# Patient Record
Sex: MALE | URBAN - METROPOLITAN AREA
[De-identification: names, ages, dates, MRNs, and addresses within clinical notes are randomized per-mention and may not be internally consistent; named-entity substitution may affect disease eponyms.]

---

## 2023-11-10 ENCOUNTER — APPOINTMENT (OUTPATIENT)
Dept: RADIOLOGY | Facility: HOSPITAL | Age: 28
DRG: 196 | End: 2023-11-10

## 2023-11-10 ENCOUNTER — HOSPITAL ENCOUNTER (INPATIENT)
Facility: HOSPITAL | Age: 28
LOS: 6 days | Discharge: HOME | DRG: 196 | End: 2023-11-17
Attending: EMERGENCY MEDICINE | Admitting: INTERNAL MEDICINE

## 2023-11-10 DIAGNOSIS — J15.9 BACTERIAL PNEUMONIA: ICD-10-CM

## 2023-11-10 DIAGNOSIS — J98.4 CAVITARY LESION OF LUNG: Primary | ICD-10-CM

## 2023-11-10 DIAGNOSIS — I76 SEPTIC EMBOLISM (MULTI): ICD-10-CM

## 2023-11-10 PROCEDURE — 86140 C-REACTIVE PROTEIN: CPT

## 2023-11-10 PROCEDURE — 71046 X-RAY EXAM CHEST 2 VIEWS: CPT | Mod: FOREIGN READ | Performed by: RADIOLOGY

## 2023-11-10 PROCEDURE — 96361 HYDRATE IV INFUSION ADD-ON: CPT

## 2023-11-10 PROCEDURE — 36415 COLL VENOUS BLD VENIPUNCTURE: CPT | Performed by: EMERGENCY MEDICINE

## 2023-11-10 PROCEDURE — 85027 COMPLETE CBC AUTOMATED: CPT | Performed by: EMERGENCY MEDICINE

## 2023-11-10 PROCEDURE — 99285 EMERGENCY DEPT VISIT HI MDM: CPT | Mod: 25 | Performed by: EMERGENCY MEDICINE

## 2023-11-10 PROCEDURE — 71046 X-RAY EXAM CHEST 2 VIEWS: CPT | Mod: FY,FR

## 2023-11-10 PROCEDURE — 87385 HISTOPLASMA CAPSUL AG IA: CPT

## 2023-11-10 PROCEDURE — 99285 EMERGENCY DEPT VISIT HI MDM: CPT | Mod: 25

## 2023-11-10 PROCEDURE — 96365 THER/PROPH/DIAG IV INF INIT: CPT

## 2023-11-10 PROCEDURE — 83690 ASSAY OF LIPASE: CPT | Performed by: EMERGENCY MEDICINE

## 2023-11-10 PROCEDURE — 2500000004 HC RX 250 GENERAL PHARMACY W/ HCPCS (ALT 636 FOR OP/ED): Performed by: EMERGENCY MEDICINE

## 2023-11-10 PROCEDURE — 99285 EMERGENCY DEPT VISIT HI MDM: CPT | Performed by: EMERGENCY MEDICINE

## 2023-11-10 PROCEDURE — 96375 TX/PRO/DX INJ NEW DRUG ADDON: CPT

## 2023-11-10 PROCEDURE — 96367 TX/PROPH/DG ADDL SEQ IV INF: CPT

## 2023-11-10 PROCEDURE — 85025 COMPLETE CBC W/AUTO DIFF WBC: CPT

## 2023-11-10 PROCEDURE — 80053 COMPREHEN METABOLIC PANEL: CPT | Performed by: EMERGENCY MEDICINE

## 2023-11-10 RX ORDER — ONDANSETRON HYDROCHLORIDE 2 MG/ML
4 INJECTION, SOLUTION INTRAVENOUS ONCE
Status: COMPLETED | OUTPATIENT
Start: 2023-11-10 | End: 2023-11-10

## 2023-11-10 RX ADMIN — ONDANSETRON 4 MG: 2 INJECTION INTRAMUSCULAR; INTRAVENOUS at 23:39

## 2023-11-10 RX ADMIN — SODIUM CHLORIDE 1000 ML: 9 INJECTION, SOLUTION INTRAVENOUS at 23:40

## 2023-11-10 ASSESSMENT — PAIN DESCRIPTION - ONSET: ONSET: ONGOING

## 2023-11-10 ASSESSMENT — PAIN - FUNCTIONAL ASSESSMENT: PAIN_FUNCTIONAL_ASSESSMENT: 0-10

## 2023-11-10 ASSESSMENT — PAIN DESCRIPTION - FREQUENCY: FREQUENCY: CONSTANT/CONTINUOUS

## 2023-11-10 ASSESSMENT — PAIN SCALES - GENERAL: PAINLEVEL_OUTOF10: 8

## 2023-11-10 ASSESSMENT — PAIN DESCRIPTION - DESCRIPTORS
DESCRIPTORS: STABBING;SHARP
DESCRIPTORS: ACHING

## 2023-11-10 ASSESSMENT — PAIN DESCRIPTION - PAIN TYPE: TYPE: ACUTE PAIN

## 2023-11-10 ASSESSMENT — COLUMBIA-SUICIDE SEVERITY RATING SCALE - C-SSRS
1. IN THE PAST MONTH, HAVE YOU WISHED YOU WERE DEAD OR WISHED YOU COULD GO TO SLEEP AND NOT WAKE UP?: NO
2. HAVE YOU ACTUALLY HAD ANY THOUGHTS OF KILLING YOURSELF?: NO
6. HAVE YOU EVER DONE ANYTHING, STARTED TO DO ANYTHING, OR PREPARED TO DO ANYTHING TO END YOUR LIFE?: NO

## 2023-11-10 ASSESSMENT — PAIN DESCRIPTION - LOCATION: LOCATION: CHEST

## 2023-11-10 ASSESSMENT — PAIN DESCRIPTION - PROGRESSION: CLINICAL_PROGRESSION: GRADUALLY WORSENING

## 2023-11-10 ASSESSMENT — PAIN DESCRIPTION - ORIENTATION: ORIENTATION: RIGHT

## 2023-11-10 NOTE — Clinical Note
RLL lung bx complete. 2 core samples obtained. Dressing C/D/I. Total 2mg versed, 100mcg fentanyl given. VSS t/o procedure.

## 2023-11-11 ENCOUNTER — APPOINTMENT (OUTPATIENT)
Dept: RADIOLOGY | Facility: HOSPITAL | Age: 28
DRG: 196 | End: 2023-11-11

## 2023-11-11 PROBLEM — I76 SEPTIC EMBOLISM (MULTI): Status: ACTIVE | Noted: 2023-11-11

## 2023-11-11 LAB
ALBUMIN SERPL BCP-MCNC: 4 G/DL (ref 3.4–5)
ALP SERPL-CCNC: 113 U/L (ref 33–120)
ALT SERPL W P-5'-P-CCNC: 24 U/L (ref 10–52)
AMPHETAMINES UR QL SCN: ABNORMAL
ANION GAP SERPL CALC-SCNC: 12 MMOL/L (ref 10–20)
APPEARANCE UR: CLEAR
APPEARANCE UR: CLEAR
AST SERPL W P-5'-P-CCNC: 15 U/L (ref 9–39)
BARBITURATES UR QL SCN: ABNORMAL
BASOPHILS # BLD AUTO: 0.05 X10*3/UL (ref 0–0.1)
BASOPHILS NFR BLD AUTO: 0.3 %
BENZODIAZ UR QL SCN: ABNORMAL
BILIRUB SERPL-MCNC: 0.9 MG/DL (ref 0–1.2)
BILIRUB UR STRIP.AUTO-MCNC: NEGATIVE MG/DL
BILIRUB UR STRIP.AUTO-MCNC: NEGATIVE MG/DL
BUN SERPL-MCNC: 11 MG/DL (ref 6–23)
BZE UR QL SCN: ABNORMAL
CALCIUM SERPL-MCNC: 9.7 MG/DL (ref 8.6–10.6)
CANNABINOIDS UR QL SCN: ABNORMAL
CHLORIDE SERPL-SCNC: 98 MMOL/L (ref 98–107)
CO2 SERPL-SCNC: 27 MMOL/L (ref 21–32)
COLOR UR: YELLOW
COLOR UR: YELLOW
CREAT SERPL-MCNC: 0.79 MG/DL (ref 0.5–1.3)
CRP SERPL-MCNC: 11.8 MG/DL
EOSINOPHIL # BLD AUTO: 0.01 X10*3/UL (ref 0–0.7)
EOSINOPHIL NFR BLD AUTO: 0.1 %
ERYTHROCYTE [DISTWIDTH] IN BLOOD BY AUTOMATED COUNT: 11.5 % (ref 11.5–14.5)
ERYTHROCYTE [DISTWIDTH] IN BLOOD BY AUTOMATED COUNT: 11.5 % (ref 11.5–14.5)
FENTANYL+NORFENTANYL UR QL SCN: ABNORMAL
GFR SERPL CREATININE-BSD FRML MDRD: >90 ML/MIN/1.73M*2
GLUCOSE SERPL-MCNC: 81 MG/DL (ref 74–99)
GLUCOSE UR STRIP.AUTO-MCNC: NEGATIVE MG/DL
GLUCOSE UR STRIP.AUTO-MCNC: NEGATIVE MG/DL
HCT VFR BLD AUTO: 43.2 % (ref 41–52)
HCT VFR BLD AUTO: 43.2 % (ref 41–52)
HGB BLD-MCNC: 14.7 G/DL (ref 13.5–17.5)
HGB BLD-MCNC: 14.7 G/DL (ref 13.5–17.5)
HOLD SPECIMEN: NORMAL
HOLD SPECIMEN: NORMAL
IMM GRANULOCYTES # BLD AUTO: 0.07 X10*3/UL (ref 0–0.7)
IMM GRANULOCYTES NFR BLD AUTO: 0.5 % (ref 0–0.9)
KETONES UR STRIP.AUTO-MCNC: NEGATIVE MG/DL
KETONES UR STRIP.AUTO-MCNC: NEGATIVE MG/DL
LEUKOCYTE ESTERASE UR QL STRIP.AUTO: NEGATIVE
LEUKOCYTE ESTERASE UR QL STRIP.AUTO: NEGATIVE
LIPASE SERPL-CCNC: 8 U/L (ref 9–82)
LYMPHOCYTES # BLD AUTO: 1.75 X10*3/UL (ref 1.2–4.8)
LYMPHOCYTES NFR BLD AUTO: 11.7 %
MCH RBC QN AUTO: 28.9 PG (ref 26–34)
MCH RBC QN AUTO: 28.9 PG (ref 26–34)
MCHC RBC AUTO-ENTMCNC: 34 G/DL (ref 32–36)
MCHC RBC AUTO-ENTMCNC: 34 G/DL (ref 32–36)
MCV RBC AUTO: 85 FL (ref 80–100)
MCV RBC AUTO: 85 FL (ref 80–100)
MONOCYTES # BLD AUTO: 1.03 X10*3/UL (ref 0.1–1)
MONOCYTES NFR BLD AUTO: 6.9 %
NEUTROPHILS # BLD AUTO: 12.03 X10*3/UL (ref 1.2–7.7)
NEUTROPHILS NFR BLD AUTO: 80.5 %
NITRITE UR QL STRIP.AUTO: NEGATIVE
NITRITE UR QL STRIP.AUTO: NEGATIVE
NRBC BLD-RTO: 0 /100 WBCS (ref 0–0)
NRBC BLD-RTO: 0 /100 WBCS (ref 0–0)
OPIATES UR QL SCN: ABNORMAL
OXYCODONE+OXYMORPHONE UR QL SCN: ABNORMAL
PCP UR QL SCN: ABNORMAL
PH UR STRIP.AUTO: 6 [PH]
PH UR STRIP.AUTO: 6 [PH]
PLATELET # BLD AUTO: 331 X10*3/UL (ref 150–450)
PLATELET # BLD AUTO: 331 X10*3/UL (ref 150–450)
POTASSIUM SERPL-SCNC: 3.9 MMOL/L (ref 3.5–5.3)
PROT SERPL-MCNC: 8.1 G/DL (ref 6.4–8.2)
PROT UR STRIP.AUTO-MCNC: NEGATIVE MG/DL
PROT UR STRIP.AUTO-MCNC: NEGATIVE MG/DL
RBC # BLD AUTO: 5.09 X10*6/UL (ref 4.5–5.9)
RBC # BLD AUTO: 5.09 X10*6/UL (ref 4.5–5.9)
RBC # UR STRIP.AUTO: NEGATIVE /UL
RBC # UR STRIP.AUTO: NEGATIVE /UL
SODIUM SERPL-SCNC: 133 MMOL/L (ref 136–145)
SP GR UR STRIP.AUTO: 1.02
SP GR UR STRIP.AUTO: 1.05
UROBILINOGEN UR STRIP.AUTO-MCNC: 2 MG/DL
UROBILINOGEN UR STRIP.AUTO-MCNC: 4 MG/DL
WBC # BLD AUTO: 15.2 X10*3/UL (ref 4.4–11.3)
WBC # BLD AUTO: 15.2 X10*3/UL (ref 4.4–11.3)

## 2023-11-11 PROCEDURE — 74177 CT ABD & PELVIS W/CONTRAST: CPT | Mod: FOREIGN READ | Performed by: RADIOLOGY

## 2023-11-11 PROCEDURE — 1100000001 HC PRIVATE ROOM DAILY

## 2023-11-11 PROCEDURE — 2500000004 HC RX 250 GENERAL PHARMACY W/ HCPCS (ALT 636 FOR OP/ED)

## 2023-11-11 PROCEDURE — 87899 AGENT NOS ASSAY W/OPTIC: CPT

## 2023-11-11 PROCEDURE — 36415 COLL VENOUS BLD VENIPUNCTURE: CPT

## 2023-11-11 PROCEDURE — 87040 BLOOD CULTURE FOR BACTERIA: CPT

## 2023-11-11 PROCEDURE — 87081 CULTURE SCREEN ONLY: CPT

## 2023-11-11 PROCEDURE — 2500000001 HC RX 250 WO HCPCS SELF ADMINISTERED DRUGS (ALT 637 FOR MEDICARE OP)

## 2023-11-11 PROCEDURE — 2500000004 HC RX 250 GENERAL PHARMACY W/ HCPCS (ALT 636 FOR OP/ED): Performed by: INTERNAL MEDICINE

## 2023-11-11 PROCEDURE — 2550000001 HC RX 255 CONTRASTS: Performed by: EMERGENCY MEDICINE

## 2023-11-11 PROCEDURE — 87305 ASPERGILLUS AG IA: CPT

## 2023-11-11 PROCEDURE — 84145 PROCALCITONIN (PCT): CPT

## 2023-11-11 PROCEDURE — 87449 NOS EACH ORGANISM AG IA: CPT

## 2023-11-11 PROCEDURE — 80307 DRUG TEST PRSMV CHEM ANLYZR: CPT

## 2023-11-11 PROCEDURE — 71275 CT ANGIOGRAPHY CHEST: CPT

## 2023-11-11 PROCEDURE — 71275 CT ANGIOGRAPHY CHEST: CPT | Mod: FOREIGN READ | Performed by: RADIOLOGY

## 2023-11-11 PROCEDURE — 87385 HISTOPLASMA CAPSUL AG IA: CPT

## 2023-11-11 PROCEDURE — 99223 1ST HOSP IP/OBS HIGH 75: CPT | Performed by: INTERNAL MEDICINE

## 2023-11-11 PROCEDURE — 74177 CT ABD & PELVIS W/CONTRAST: CPT | Mod: FR

## 2023-11-11 PROCEDURE — 2550000001 HC RX 255 CONTRASTS

## 2023-11-11 PROCEDURE — 81003 URINALYSIS AUTO W/O SCOPE: CPT

## 2023-11-11 PROCEDURE — 81003 URINALYSIS AUTO W/O SCOPE: CPT | Performed by: EMERGENCY MEDICINE

## 2023-11-11 RX ORDER — SODIUM CHLORIDE, SODIUM LACTATE, POTASSIUM CHLORIDE, CALCIUM CHLORIDE 600; 310; 30; 20 MG/100ML; MG/100ML; MG/100ML; MG/100ML
75 INJECTION, SOLUTION INTRAVENOUS CONTINUOUS
Status: DISCONTINUED | OUTPATIENT
Start: 2023-11-11 | End: 2023-11-13

## 2023-11-11 RX ORDER — AZITHROMYCIN 500 MG/1
500 TABLET, FILM COATED ORAL
Status: COMPLETED | OUTPATIENT
Start: 2023-11-11 | End: 2023-11-13

## 2023-11-11 RX ORDER — VANCOMYCIN HYDROCHLORIDE 750 MG/150ML
1.5 INJECTION, SOLUTION INTRAVENOUS ONCE
Status: COMPLETED | OUTPATIENT
Start: 2023-11-11 | End: 2023-11-11

## 2023-11-11 RX ADMIN — PIPERACILLIN SODIUM AND TAZOBACTAM SODIUM 4.5 G: 4; .5 INJECTION, SOLUTION INTRAVENOUS at 16:12

## 2023-11-11 RX ADMIN — IOHEXOL 75 ML: 350 INJECTION, SOLUTION INTRAVENOUS at 02:18

## 2023-11-11 RX ADMIN — VANCOMYCIN HYDROCHLORIDE 1.5 G: 750 INJECTION, SOLUTION INTRAVENOUS at 04:10

## 2023-11-11 RX ADMIN — PIPERACILLIN SODIUM AND TAZOBACTAM SODIUM 4.5 G: 4; .5 INJECTION, SOLUTION INTRAVENOUS at 23:24

## 2023-11-11 RX ADMIN — AZITHROMYCIN DIHYDRATE 500 MG: 500 TABLET ORAL at 10:12

## 2023-11-11 RX ADMIN — PIPERACILLIN SODIUM AND TAZOBACTAM SODIUM 4.5 G: 4; .5 INJECTION, SOLUTION INTRAVENOUS at 10:23

## 2023-11-11 RX ADMIN — VANCOMYCIN HYDROCHLORIDE 1500 MG: 1.5 INJECTION, POWDER, LYOPHILIZED, FOR SOLUTION INTRAVENOUS at 16:00

## 2023-11-11 RX ADMIN — TAZOBACTAM SODIUM AND PIPERACILLIN SODIUM 4.5 G: 500; 4 INJECTION, SOLUTION INTRAVENOUS at 03:40

## 2023-11-11 RX ADMIN — IOHEXOL 100 ML: 350 INJECTION, SOLUTION INTRAVENOUS at 00:39

## 2023-11-11 RX ADMIN — SODIUM CHLORIDE, POTASSIUM CHLORIDE, SODIUM LACTATE AND CALCIUM CHLORIDE 75 ML/HR: 600; 310; 30; 20 INJECTION, SOLUTION INTRAVENOUS at 20:57

## 2023-11-11 SDOH — SOCIAL STABILITY: SOCIAL INSECURITY: ARE THERE ANY APPARENT SIGNS OF INJURIES/BEHAVIORS THAT COULD BE RELATED TO ABUSE/NEGLECT?: NO

## 2023-11-11 SDOH — SOCIAL STABILITY: SOCIAL INSECURITY: HAS ANYONE EVER THREATENED TO HURT YOUR FAMILY OR YOUR PETS?: NO

## 2023-11-11 SDOH — SOCIAL STABILITY: SOCIAL INSECURITY: WERE YOU ABLE TO COMPLETE ALL THE BEHAVIORAL HEALTH SCREENINGS?: YES

## 2023-11-11 SDOH — SOCIAL STABILITY: SOCIAL INSECURITY: ABUSE: ADULT

## 2023-11-11 SDOH — SOCIAL STABILITY: SOCIAL INSECURITY: HAVE YOU HAD THOUGHTS OF HARMING ANYONE ELSE?: NO

## 2023-11-11 SDOH — SOCIAL STABILITY: SOCIAL INSECURITY: DO YOU FEEL UNSAFE GOING BACK TO THE PLACE WHERE YOU ARE LIVING?: NO

## 2023-11-11 SDOH — SOCIAL STABILITY: SOCIAL INSECURITY: DO YOU FEEL ANYONE HAS EXPLOITED OR TAKEN ADVANTAGE OF YOU FINANCIALLY OR OF YOUR PERSONAL PROPERTY?: NO

## 2023-11-11 SDOH — SOCIAL STABILITY: SOCIAL INSECURITY: DOES ANYONE TRY TO KEEP YOU FROM HAVING/CONTACTING OTHER FRIENDS OR DOING THINGS OUTSIDE YOUR HOME?: NO

## 2023-11-11 SDOH — SOCIAL STABILITY: SOCIAL INSECURITY: ARE YOU OR HAVE YOU BEEN THREATENED OR ABUSED PHYSICALLY, EMOTIONALLY, OR SEXUALLY BY ANYONE?: NO

## 2023-11-11 ASSESSMENT — LIFESTYLE VARIABLES
SUBSTANCE_ABUSE_PAST_12_MONTHS: YES
HOW OFTEN DO YOU HAVE A DRINK CONTAINING ALCOHOL: MONTHLY OR LESS
HOW MANY STANDARD DRINKS CONTAINING ALCOHOL DO YOU HAVE ON A TYPICAL DAY: 1 OR 2
SKIP TO QUESTIONS 9-10: 1
HOW OFTEN DO YOU HAVE 6 OR MORE DRINKS ON ONE OCCASION: NEVER
AUDIT-C TOTAL SCORE: 1
AUDIT-C TOTAL SCORE: 1
PRESCIPTION_ABUSE_PAST_12_MONTHS: NO

## 2023-11-11 ASSESSMENT — ACTIVITIES OF DAILY LIVING (ADL)
PATIENT'S MEMORY ADEQUATE TO SAFELY COMPLETE DAILY ACTIVITIES?: YES
GROOMING: INDEPENDENT
TOILETING: INDEPENDENT
ADEQUATE_TO_COMPLETE_ADL: YES
HEARING - LEFT EAR: FUNCTIONAL
HEARING - RIGHT EAR: FUNCTIONAL
BATHING: INDEPENDENT
WALKS IN HOME: INDEPENDENT
JUDGMENT_ADEQUATE_SAFELY_COMPLETE_DAILY_ACTIVITIES: YES
LACK_OF_TRANSPORTATION: NO
FEEDING YOURSELF: INDEPENDENT
DRESSING YOURSELF: INDEPENDENT
LACK_OF_TRANSPORTATION: NO

## 2023-11-11 ASSESSMENT — COGNITIVE AND FUNCTIONAL STATUS - GENERAL
DAILY ACTIVITIY SCORE: 24
MOBILITY SCORE: 24
PATIENT BASELINE BEDBOUND: NO

## 2023-11-11 ASSESSMENT — PATIENT HEALTH QUESTIONNAIRE - PHQ9
1. LITTLE INTEREST OR PLEASURE IN DOING THINGS: NOT AT ALL
2. FEELING DOWN, DEPRESSED OR HOPELESS: NOT AT ALL
SUM OF ALL RESPONSES TO PHQ9 QUESTIONS 1 & 2: 0

## 2023-11-11 ASSESSMENT — PAIN SCALES - GENERAL: PAINLEVEL_OUTOF10: 8

## 2023-11-11 NOTE — PROGRESS NOTES
"Vancomycin Dosing by Pharmacy- INITIAL    Deff Uriel Saldivar is a 28 y.o. year old male who Pharmacy has been consulted for vancomycin dosing for endocarditis/endovascular infection. Based on the patient's indication and renal status this patient will be dosed based on a goal AUC of 500-600.     Renal function is currently stable.    Visit Vitals  /73   Pulse 102   Temp 37.3 °C (99.1 °F) (Oral)   Resp 14        Lab Results   Component Value Date    CREATININE 0.79 11/10/2023        Patient weight is No results found for: \"PTWEIGHT\"    No results found for: \"CULTURE\"     I/O last 3 completed shifts:  In: 2533.3 [IV Piggyback:2533.3]  Out: -   [unfilled]    No results found for: \"PATIENTTEMP\"       Assessment/Plan     Patient has already been given a loading dose of 1500 mg.  Will initiate vancomycin maintenance,  1500 mg every 12 hours.    This dosing regimen is predicted by [employer] to result in the following pharmacokinetic parameters:  Regimen: 1500 mg IV every 12 hours.  Start time: 16:10 on 11/11/2023  Exposure target: AUC24 (range)400-600 mg/L.hr   AUC24,ss: 561 mg/L.hr  Probability of AUC24 > 400: 81 %  Ctrough,ss: 16 mg/L  Probability of Ctrough,ss > 20: 34 %  Probability of nephrotoxicity (Lodise GARCIA 2009): 11 %    Follow-up level will be ordered on 11/12 at am labs unless clinically indicated sooner.  Will continue to monitor renal function daily while on vancomycin and order serum creatinine at least every 48 hours if not already ordered.  Follow for continued vancomycin needs, clinical response, and signs/symptoms of toxicity.       Jailene Mooney, PharmD       "

## 2023-11-11 NOTE — HOSPITAL COURSE
Mr. Saldivar is a 28M [place] immigrant without significant medical history who presented to [facility] ED with 2 week history of SOA, right-sided chest pain, nausea, and emesis, found to have pulmonary septic emboli and a RLL cavitary lesion, who is being admitted for infectious workup on 11/11/23.     #Pulmonary Septic Emboli  #RLL Cavitary lesion  #history of dental carries  #Leukocytosis  ::CTPE (11/11/23) Multifocal bilateral nodular airspace disease with areas of cavitation in the right lower lobe   ::WBC (11/11/23) - 15.2, CRP - 11.8  ::Lipase - wnl  ::Bcx x2 (11/11) before antibiotics, to be repeated if pt fevers  ::etiology - Suspected rare bacterial/fungal cavitary pneumonia > aspiration pneumonia/CAP > infective endocarditis > rheumatologic disease  - c/w Vancomycin, Zosyn and start Azithromycin for atypical coverage (3-day course) pending infectious workup  - TTE to rule out endocarditis  - FU MRSA Nares, Strep/legionella/Histo Ag, Procal, Beta D Glucan  - FU HIV, HCV, Quantiferon Gold  - FU CBC w/ DIFF --> pending DIFF  - ID consulted for further recs     #LE Weakness  :: 5/5 LE strength b/l with intact DTRs and sensation  - CTM     #Tobacco Use Disorder  - Assess need for NRT     O2: RA  Electrolytes: PRN  Lines/Tubes: PIV  Abx: Vanc, Zosyn, Azithro  Drips: None  Diet: Adult Regular  GI ppx: None  DVT ppx: SCDs  Code Status: Full Code (confirmed on Admission)  NOK: Mildred Oneill (Girlfriend) - [phone]  Dispo: Medicine Floor

## 2023-11-11 NOTE — H&P
====================================  MICU ADMISSION NOTE  ====================================    MRN: [id number]   CHIEF COMPLAINT  ====================================    Chief Complaint   Patient presents with    Chest Pain      HISTORY OF PRESENT ILLNESS  ====================================  Defdhruv is a 28 y.o.  yo male w/ no PMHx presenting for two week history of right-sided chest pain/flank pain for the past two weeks being admitted for septic emboli to the lung and cavitary lesion in the RLL.    ED course:  Blood pressure 108/69, pulse 96, temperature 37.3 °C (99.1 °F), temperature source Oral, resp. rate 26, height 1.829 m (6'), SpO2 93 %.    Labs:  Results from last 7 days   Lab Units 11/10/23  2344   WBC AUTO x10*3/uL 15.2*   HEMOGLOBIN g/dL 14.7   HEMATOCRIT % 43.2   PLATELETS AUTO x10*3/uL 331      Results from last 7 days   Lab Units 11/10/23  2344   SODIUM mmol/L 133*   POTASSIUM mmol/L 3.9   CHLORIDE mmol/L 98   CO2 mmol/L 27   BUN mg/dL 11   CREATININE mg/dL 0.79   CALCIUM mg/dL 9.7   PROTEIN TOTAL g/dL 8.1   BILIRUBIN TOTAL mg/dL 0.9   ALK PHOS U/L 113   ALT U/L 24   AST U/L 15   GLUCOSE mg/dL 81      Imaging:   CT angio chest for pulmonary embolism  Result Date: 11/11/2023  FINDINGS: Pulmonary arteries are adequately opacified without acute or chronic filling defects.  The thoracic aorta is normal in course and caliber without dissection or aneurysm. The heart is normal in size without pericardial effusion.  Mildly enlarged mediastinal and hilar lymph nodes are seen, likely reactive. There is no pleural thickening or pneumothorax.  The airways are patent. Lungs are adequately expanded with multifocal areas of nodular airspace disease in both lungs, largest right representing a cavitary lesion in the posterolateral aspect of the right lower lobe at the right lung base, measuring 4.9 x 2.9 cm in diameter with surrounding groundglass opacity.  Small cavitary lesion is seen in the posterior aspect of  the right lower lobe on image 25 measuring 1.3 x 1.4 cm in diameter.  Solid lesions are identified in the left lung along the major fissure laterally on image 173 as well as in the medial aspect of the right middle lobe.  There is a small dependently layering right pleural effusion. Upper abdomen demonstrates no acute pathology. There are no acute fractures.  No suspicious bony lesions.    Multifocal bilateral nodular airspace disease with areas of cavitation in the right lower lobe as described, consistent the patient's reported septic emboli. No evidence of pulmonary embolus identified. Suspected reactive mediastinal and hilar adenopathy. Signed by Percy Nunes    CT abdomen pelvis w IV contrast  Result Date: 11/11/2023  FINDINGS: LOWER CHEST: Scattered airspace opacities in lung bases.  There is a small round cavitary lesion with a thick wall in the left lower lobe on image 25 measuring 7 mm.  In the right posterolateral costophrenic angle is an irregular thick-walled cavitary lesion with central debris/fluid and surrounding lung opacities.  This measures approximately 4.8 x 2.4 cm.  No pleural fluid.  No inferior pericardial effusion.  Normal heart size.  ABDOMEN:  LIVER: No hepatomegaly.  Smooth surface contour.  Normal attenuation.  No mass or abscess.  BILE DUCTS: No intrahepatic or extrahepatic biliary ductal dilatation.  GALLBLADDER: No CT evidence of cholelithiasis.  STOMACH: No abnormalities identified.  PANCREAS: No masses or ductal dilatation.  No acute findings.  SPLEEN: No splenomegaly or focal splenic lesion.  ADRENAL GLANDS: No thickening or nodules.  KIDNEYS AND URETERS: Kidneys are normal in size and location.  No renal or ureteral calculi.  No suspicious mass.  Small cystic area upper pole right kidney 9 mm.  No hydronephrosis.  PELVIS:  BLADDER: No abnormalities identified.  REPRODUCTIVE ORGANS: No abnormalities identified.  BOWEL: Generalized colonic stool burden.  VESSELS: No abnormalities  identified.  Abdominal aorta is normal in caliber.  PERITONEUM/RETROPERITONEUM/LYMPH NODES: No free fluid.  No pneumoperitoneum. No lymphadenopathy.  ABDOMINAL WALL: Umbilical hernia containing nondilated small bowel.  BONES: No acute fracture or aggressive osseous lesion.  No endplate erosive changes.  Vertebral body heights and disc spaces are maintained of the visualized spine.    Multifocal lesions in the lung bases including a dominant 4.8 x 2.4 cm cavitary right lower lobe lesion.  Appearance is highly suggestive of septic emboli.  Incompletely visualized process.  Recommend dedicated chest CT for complete visualization. No acute pathology is identified in the abdomen or pelvis. Umbilical hernia containing small bowel. Signed by Sandor Rutledge DO    XR chest 2 views  Result Date: 11/11/2023  FINDINGS: CARDIOMEDIASTINAL SILHOUETTE: Cardiomediastinal silhouette is normal in size and configuration.  LUNGS: Small focal opacity in the right lung base laterally.  Linear opacity in the left lung base.  Remainder of the lungs are clear.  No pleural effusions or pneumothorax.  ABDOMEN: No remarkable upper abdominal findings.  BONES: No acute osseous changes.    Small indeterminate opacity in the right lung base could reflect atelectasis or pneumonia.  Linear opacity in the left lung base consistent with platelike atelectasis or scarring. Signed by Sandor Rutledge DO    Interventions:   Empirically started on Vanc/Zosyn  Given zofran for nausea    On admission:  Pt states he is doing well besides his right sided pain. When asked to point with one finger where it hurts worst, he points to his right lower ribs. He states it started suddenly about two weeks ago. He states he has not had any sick contacts. He attests to subjective fever, nausea, and vomiting the last two days which has improved since he arrived to the ED. He states his emesis was non-bloody. Pt states no longer nauseated nor having abdominal pain. States  "he has something on his belly button since birth.    Patient endorses in 2016 prior to immigrating, he was making dinner with a friend and dropped something. When he reach down to pick it up, he heard a \"large snapping\" noise in his back but was without pain or acute symptoms at the time. However, since then, he says he has progressively worsening leg weakness where he feels more tired and has to rest. He states the LE weakness is b/l without saddle anesthesias nor paresthesias. He further denies any urinary or bowel incontinence nor falls.    Relevant History:  ID Hx:  Pt states he is an immigrant from [facility]. He attests to moving in 2017 and states he \"received all the vaccinations\" before arriving. He is unaware of any skin testing nor PPD. Denies obscure hobbies, recent travel history, hiking, or sick contacts. He states two years ago he had some dental work including two tooth extractions of lower molars due to dental carries.    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 He lives at home with his girlfriend and two year old child.    He denies alcohol use, denies illicit substances besides marijuana, and states he smokes 2-3 cigarillos/day since immigrating to the , but no other tobacco use history since nor prior.    Fhx: No known genetic conditions including immunodeficiencies. Patient further denies any chronic illness in first-degree family members.    Cardiac/GI Hx  Infectious/Oncologic Timeline:  Echocardiography:  No echocardiogram results found for the past 12 months     PAST MEDICAL HISTORY  ====================================  History reviewed. No pertinent past medical history.     PAST SURGICAL HISTORY  ====================================  History reviewed. No pertinent surgical history.     MEDICATIONS  ====================================  No current outpatient medications "     ALLERGIES  ====================================  No Known Allergies     Social History     Socioeconomic History    Marital status: Single     Spouse name: Not on file    Number of children: Not on file    Years of education: Not on file    Highest education level: Not on file   Occupational History    Not on file   Tobacco Use    Smoking status: Every Day     Types: Cigarettes    Smokeless tobacco: Never   Substance and Sexual Activity    Alcohol use: Not on file    Drug use: Not on file    Sexual activity: Not on file   Other Topics Concern    Not on file   Social History Narrative    Not on file     Social Determinants of Health     Financial Resource Strain: Not on file   Food Insecurity: Not on file   Transportation Needs: Not on file   Physical Activity: Not on file   Stress: Not on file   Social Connections: Not on file   Intimate Partner Violence: Not on file   Housing Stability: Not on file      REVIEW OF SYSTEMS:   ====================================  Review of Systems 12pt negative unless otherwise noted in HPI.    PHYSICAL EXAM:  ====================================  Blood pressure 108/69, pulse 96, temperature 37.3 °C (99.1 °F), temperature source Oral, resp. rate 26, height 1.829 m (6'), SpO2 93 %.     Lines: PIV  Drips: Vanc/Zosyn  Tubes: None    Intake/Output Summary (Last 24 hours) at 11/11/2023 0834  Last data filed at 11/11/2023 0455  Gross per 24 hour   Intake 2533.33 ml   Output --   Net 2533.33 ml      Physical Exam  Constitutional:       General: He is not in acute distress.     Appearance: Normal appearance. He is normal weight.   HENT:      Head: Normocephalic and atraumatic.      Mouth/Throat:      Mouth: Mucous membranes are moist.      Pharynx: Oropharynx is clear. No oropharyngeal exudate or posterior oropharyngeal erythema.   Eyes:      General: No scleral icterus.     Conjunctiva/sclera: Conjunctivae normal.      Pupils: Pupils are equal, round, and reactive to light.       Comments: No intra-orbital septic emboli   Cardiovascular:      Rate and Rhythm: Normal rate and regular rhythm.      Pulses: Normal pulses.      Heart sounds: Normal heart sounds. No murmur heard.  Pulmonary:      Effort: Pulmonary effort is normal.      Breath sounds: Normal breath sounds. No wheezing, rhonchi or rales.      Comments: Tenderness to palpation on right lower ribs and CVA  Chest:      Chest wall: Tenderness present.   Abdominal:      General: Abdomen is flat. Bowel sounds are normal.      Palpations: Abdomen is soft.      Tenderness: There is no abdominal tenderness. There is no guarding or rebound.      Comments: Small umbilical hernia, present since birth   Musculoskeletal:      Cervical back: Neck supple. No rigidity or tenderness.      Right lower leg: No edema.      Left lower leg: No edema.   Skin:     General: Skin is warm and dry.      Capillary Refill: Capillary refill takes less than 2 seconds.      Findings: No bruising, erythema or rash.      Comments: Fingernails, toenails, palms, soles, digit, and inter-digit webs all without obvious evidence of embolization. No Janeway lesions nor osler nodes   Neurological:      General: No focal deficit present.      Mental Status: He is alert and oriented to person, place, and time.      Motor: No weakness.      Coordination: Coordination normal.      Deep Tendon Reflexes: Reflexes normal.   Psychiatric:         Mood and Affect: Mood normal.         Behavior: Behavior normal.         Thought Content: Thought content normal.         Judgment: Judgment normal.        LABS:  ====================================  Results for orders placed or performed during the hospital encounter of 11/10/23 (from the past 24 hour(s))   Comprehensive metabolic panel   Result Value Ref Range    Glucose 81 74 - 99 mg/dL    Sodium 133 (L) 136 - 145 mmol/L    Potassium 3.9 3.5 - 5.3 mmol/L    Chloride 98 98 - 107 mmol/L    Bicarbonate 27 21 - 32 mmol/L    Anion Gap 12 10 -  20 mmol/L    Urea Nitrogen 11 6 - 23 mg/dL    Creatinine 0.79 0.50 - 1.30 mg/dL    eGFR >90 >60 mL/min/1.73m*2    Calcium 9.7 8.6 - 10.6 mg/dL    Albumin 4.0 3.4 - 5.0 g/dL    Alkaline Phosphatase 113 33 - 120 U/L    Total Protein 8.1 6.4 - 8.2 g/dL    AST 15 9 - 39 U/L    Bilirubin, Total 0.9 0.0 - 1.2 mg/dL    ALT 24 10 - 52 U/L   CBC   Result Value Ref Range    WBC 15.2 (H) 4.4 - 11.3 x10*3/uL    nRBC 0.0 0.0 - 0.0 /100 WBCs    RBC 5.09 4.50 - 5.90 x10*6/uL    Hemoglobin 14.7 13.5 - 17.5 g/dL    Hematocrit 43.2 41.0 - 52.0 %    MCV 85 80 - 100 fL    MCH 28.9 26.0 - 34.0 pg    MCHC 34.0 32.0 - 36.0 g/dL    RDW 11.5 11.5 - 14.5 %    Platelets 331 150 - 450 x10*3/uL   Lipase   Result Value Ref Range    Lipase 8 (L) 9 - 82 U/L   C-reactive protein   Result Value Ref Range    C-Reactive Protein 11.80 (H) <1.00 mg/dL   CBC and Auto Differential   Result Value Ref Range    WBC 15.2 (H) 4.4 - 11.3 x10*3/uL    nRBC 0.0 0.0 - 0.0 /100 WBCs    RBC 5.09 4.50 - 5.90 x10*6/uL    Hemoglobin 14.7 13.5 - 17.5 g/dL    Hematocrit 43.2 41.0 - 52.0 %    MCV 85 80 - 100 fL    MCH 28.9 26.0 - 34.0 pg    MCHC 34.0 32.0 - 36.0 g/dL    RDW 11.5 11.5 - 14.5 %    Platelets 331 150 - 450 x10*3/uL    Neutrophils % 80.5 40.0 - 80.0 %    Immature Granulocytes %, Automated 0.5 0.0 - 0.9 %    Lymphocytes % 11.7 13.0 - 44.0 %    Monocytes % 6.9 2.0 - 10.0 %    Eosinophils % 0.1 0.0 - 6.0 %    Basophils % 0.3 0.0 - 2.0 %    Neutrophils Absolute 12.03 (H) 1.20 - 7.70 x10*3/uL    Immature Granulocytes Absolute, Automated 0.07 0.00 - 0.70 x10*3/uL    Lymphocytes Absolute 1.75 1.20 - 4.80 x10*3/uL    Monocytes Absolute 1.03 (H) 0.10 - 1.00 x10*3/uL    Eosinophils Absolute 0.01 0.00 - 0.70 x10*3/uL    Basophils Absolute 0.05 0.00 - 0.10 x10*3/uL   Urinalysis with Reflex Microscopic and Culture   Result Value Ref Range    Color, Urine Yellow Straw, Yellow    Appearance, Urine Clear Clear    Specific Gravity, Urine 1.017 1.005 - 1.035    pH, Urine 6.0  5.0, 5.5, 6.0, 6.5, 7.0, 7.5, 8.0    Protein, Urine NEGATIVE NEGATIVE mg/dL    Glucose, Urine NEGATIVE NEGATIVE mg/dL    Blood, Urine NEGATIVE NEGATIVE    Ketones, Urine NEGATIVE NEGATIVE mg/dL    Bilirubin, Urine NEGATIVE NEGATIVE    Urobilinogen, Urine 4.0 (N) <2.0 mg/dL    Nitrite, Urine NEGATIVE NEGATIVE    Leukocyte Esterase, Urine NEGATIVE NEGATIVE   Extra Urine Gray Tube   Result Value Ref Range    Extra Tube Hold for add-ons.    Drug Screen, Urine   Result Value Ref Range    Amphetamine Screen, Urine Presumptive Negative Presumptive Negative    Barbiturate Screen, Urine Presumptive Negative Presumptive Negative    Benzodiazepines Screen, Urine Presumptive Negative Presumptive Negative    Cannabinoid Screen, Urine Presumptive Positive (A) Presumptive Negative    Cocaine Metabolite Screen, Urine Presumptive Negative Presumptive Negative    Fentanyl Screen, Urine Presumptive Negative Presumptive Negative    Opiate Screen, Urine Presumptive Negative Presumptive Negative    Oxycodone Screen, Urine Presumptive Negative Presumptive Negative    PCP Screen, Urine Presumptive Negative Presumptive Negative   Urinalysis with Reflex Microscopic and Culture   Result Value Ref Range    Color, Urine Yellow Straw, Yellow    Appearance, Urine Clear Clear    Specific Gravity, Urine 1.048 (N) 1.005 - 1.035    pH, Urine 6.0 5.0, 5.5, 6.0, 6.5, 7.0, 7.5, 8.0    Protein, Urine NEGATIVE NEGATIVE mg/dL    Glucose, Urine NEGATIVE NEGATIVE mg/dL    Blood, Urine NEGATIVE NEGATIVE    Ketones, Urine NEGATIVE NEGATIVE mg/dL    Bilirubin, Urine NEGATIVE NEGATIVE    Urobilinogen, Urine 2.0 (N) <2.0 mg/dL    Nitrite, Urine NEGATIVE NEGATIVE    Leukocyte Esterase, Urine NEGATIVE NEGATIVE   Extra Urine Gray Tube   Result Value Ref Range    Extra Tube Hold for add-ons.    Blood Culture    Specimen: Peripheral Venipuncture; Blood culture   Result Value Ref Range    Blood Culture Loaded on Instrument - Culture in progress    Blood Culture     Specimen: Peripheral Venipuncture; Blood culture   Result Value Ref Range    Blood Culture Loaded on Instrument - Culture in progress      ASSESSMENT & PLAN  ====================================  Mr. Saldivar is a 28M [place] immigrant without significant medical history who presented to [facility] ED with 2 week history of SOA, right-sided chest pain, nausea, and emesis, found to have pulmonary septic emboli and a RLL cavitary lesion, who is being admitted for infectious workup.    #Pulmonary Septic Emboli  #RLL Cavitary lesion  #history of dental carries  #Leukocytosis  ::CTPE (11/11/23) Multifocal bilateral nodular airspace disease with areas of cavitation in the right lower lobe   ::WBC (11/11/23) - 15.2, CRP - 11.8  ::Lipase - wnl  ::Bcx x2 (11/11) before antibiotics, to be repeated if pt fevers  ::etiology - Suspected rare bacterial/fungal cavitary pneumonia > aspiration pneumonia/CAP > infective endocarditis > rheumatologic disease  - c/w Vancomycin, Zosyn and start Azithromycin for atypical coverage (3-day course) pending infectious workup  - TTE to rule out endocarditis  - FU MRSA Nares, Strep/legionella/Histo Ag, Procal, Beta D Glucan  - FU HIV, HCV, Quantiferon Gold  - FU CBC w/ DIFF --> pending DIFF  - ID consulted for further recs    #LE Weakness  :: 5/5 LE strength b/l with intact DTRs and sensation  - CTM    #Tobacco Use Disorder  - Assess need for NRT    O2: RA  Electrolytes: PRN  Lines/Tubes: PIV  Abx: Vanc, Zosyn, Azithro  Drips: None  Diet: Adult Regular  GI ppx: None  DVT ppx: SCDs  Code Status: Full Code (confirmed on Admission)  NOK: Mildred Oneill (Girlfriend) - [phone]  Dispo: Medicine Floor    Silver Zamora, DO  Internal Medicine-Genetics, PGY-1

## 2023-11-11 NOTE — PROGRESS NOTES
11/11/23 1018   Discharge Planning   Living Arrangements Spouse/significant other   Support Systems Spouse/significant other   Assistance Needed Denies any needs. Pt is unfunded so I provided an HRS for him to assist with this.   Type of Residence Private residence   Number of Stairs to Enter Residence 0   Number of Stairs Within Residence 0   Do you have animals or pets at home? No   Who is requesting discharge planning? Other (Comment)   Patient expects to be discharged to: Home   Does the patient need discharge transport arranged? No   Financial Resource Strain   How hard is it for you to pay for the very basics like food, housing, medical care, and heating? Not very   Housing Stability   In the last 12 months, was there a time when you were not able to pay the mortgage or rent on time? N   In the last 12 months, was there a time when you did not have a steady place to sleep or slept in a shelter (including now)? N   Transportation Needs   In the past 12 months, has lack of transportation kept you from medical appointments or from getting medications? no   In the past 12 months, has lack of transportation kept you from meetings, work, or from getting things needed for daily living? No     Pt to be admitted to hospital for continued workup. Denies any needs at discharge. Lives with his girlfriend and their baby. Pt is unfunded so I provided him with a Hospital Resource Sheet to assist with financial aid.   PERRY CORTEZ Transitional care Coordinator       Manual Repair Warning Statement: We plan on removing the manually selected variable below in favor of our much easier automatic structured text blocks found in the previous tab. We decided to do this to help make the flow better and give you the full power of structured data. Manual selection is never going to be ideal in our platform and I would encourage you to avoid using manual selection from this point on, especially since I will be sunsetting this feature. It is important that you do one of two things with the customized text below. First, you can save all of the text in a word file so you can have it for future reference. Second, transfer the text to the appropriate area in the Library tab. Lastly, if there is a flap or graft type which we do not have you need to let us know right away so I can add it in before the variable is hidden. No need to panic, we plan to give you roughly 6 months to make the change.

## 2023-11-11 NOTE — ED TRIAGE NOTES
Patient to ED with complaints of R sided chest pain that started about 2 weeks ago and has worsened overtime- also worsens with breathing. Endorses he vomited twice today at work. Denies nausea at this time. Denies medical history. Is an everyday cigarette smoker. Denies other symptoms.

## 2023-11-11 NOTE — PROGRESS NOTES
Pharmacy Medication History Review    Dick Saldivar is a 28 y.o. male admitted for No Principal Problem: There is no principal problem currently on the Problem List. Pharmacy reviewed the patient's [id number] medications and allergies for accuracy.    The list below reflects the updated PTA list. Comments regarding how patient may be taking medications differently can be found in the Admit Orders Activity  None        The list below reflects the updated allergy list. Please review each documented allergy for additional clarification and justification.  Allergies  Reviewed by Nilda Mcdaniels PharmD on 11/11/2023   No Known Allergies         Patient accepts M2B at discharge. Pharmacy has been updated to [facility].    Sources used to complete the med history include Admission MedRec Grid, [facility] electronic pharmacy fill history (none), OARRS (none), CareEverywhere (no recent office or hospital visits), patient interview    Below are additional concerns with the patient's PTA list.  The patient endorses currently using no prescription or over the counter medications. This is consistent with the above reviewed sources.     Nilda Mcdaniels PharmD   Transitions of Care Pharmacist  [facility]  Please reach out via Secure Chat for questions, or if no response call [employer] or [facility]

## 2023-11-11 NOTE — PROGRESS NOTES
Patient was handed off to me from the previous team. For full history, physical, and prior ED course, please see previous provider note prior to patient handoff. This is an addendum to the record.    Briefly, this is a 28-year-old male with no major medical history presenting with chest pain.  At time of signout, patient was pending results from CT abdomen and pelvis with concern for possible gallbladder related pathology.  CT of the abdomen was unremarkable for intra-abdominal pathology however question for possible septic emboli in the lower lung fields.  Given patient has been tachycardic and is still endorsing chest pain, CT PE study ordered which confirmed multiple cavitary lesions bilaterally consistent with septic emboli.  Patient denies IV drug use.  Patient started on IV antibiotics, blood cultures drawn, and admitted to medicine for further work-up and will require an echo to rule out endocarditis versus other causes of his findings.  Patient updated with plan for admission and is agreeable.  Patient admitted in stable condition.      Throughout the ED stay, the patient was monitored and re-examined for any changes in stability or symptomatology.     Pt seen and discussed with Dr. Ramon Ibarra DO.  Emergency Medicine PGY-2    I saw and evaluated the patient. I personally obtained the key and critical portions of the history and physical exam or was physically present for key and critical portions performed by the resident/fellow. I reviewed the resident/fellow's documentation and discussed the patient with the resident/fellow. I agree with the resident/fellow's medical decision making as documented in the note.    Chelsy Navarro MD

## 2023-11-11 NOTE — ED PROVIDER NOTES
HPI   Chief Complaint   Patient presents with    Chest Pain       The patient is a 29 y/o M with no significant pmh who presents to the ED with right sided chest and abdominal pain for two weeks. Per the patient, he has also been feeling like he has a fever at home and the past two days has been feeling nauseous and vomited multiple times throughout the day. He also endorses right CVA pain and increased difficulty breathing. He denies any dysuria, hematuria, HA, neck rigidity, vision loss, muscle weakness/sensation loss, and diarrhea. Patient also endorses decreased appetite and that they have been taking tylenol at home to help with the pain.                           No data recorded                Patient History   History reviewed. No pertinent past medical history.  History reviewed. No pertinent surgical history.  No family history on file.  Social History     Tobacco Use    Smoking status: Every Day     Types: Cigarettes    Smokeless tobacco: Never   Substance Use Topics    Alcohol use: Not on file    Drug use: Not on file       Physical Exam   ED Triage Vitals   Temp Heart Rate Resp BP   11/10/23 2221 11/10/23 2217 11/10/23 2217 11/10/23 2217   38 °C (100.4 °F) 110 18 104/60      SpO2 Temp Source Heart Rate Source Patient Position   11/10/23 2217 11/10/23 2221 11/10/23 2217 11/10/23 2217   94 % Oral Monitor Sitting      BP Location FiO2 (%)     11/10/23 2217 --     Right arm        Physical Exam  Vitals and nursing note reviewed.   Constitutional:       Comments: Appears uncomfortable   HENT:      Head: Normocephalic.   Eyes:      Extraocular Movements: Extraocular movements intact.      Pupils: Pupils are equal, round, and reactive to light.   Cardiovascular:      Rate and Rhythm: Regular rhythm. Tachycardia present.      Heart sounds: Normal heart sounds.   Pulmonary:      Effort: Pulmonary effort is normal.      Breath sounds: Normal breath sounds.   Chest:      Chest wall: Tenderness (tenderness to  palpation of right chest wall) present.   Abdominal:      Comments: Tenderness to palpation of RUQ with rigidity present. Sonographic gautam sign present during ultrasound. Tenderness to right CVA on percussion. No signs of guarding or rebound tenderness. Negative psoas and Rovsings sign   Musculoskeletal:      Cervical back: Normal range of motion and neck supple.      Comments: Can move all four extremities with no difficulty   Skin:     General: Skin is warm and dry.      Capillary Refill: Capillary refill takes less than 2 seconds.   Neurological:      General: No focal deficit present.      Mental Status: He is alert and oriented to person, place, and time.   Psychiatric:         Mood and Affect: Mood normal.       ED Course & MDM   Diagnoses as of 11/11/23 2795   Septic embolism (CMS/HCC)       Medical Decision Making  The patient is a 27 y/o M with no significant pmh who presents to the ED with r sided chest and abdominal pain. The patient is febrile here, saturating in the low 90's and tachycardic. PE reveals rigidity on palpation of RUQ with no signs of guarding or rebound tenderness. In addition the patient did have some tenderness to right CVA percussion. Differential diagnoses include cholecystitis, pancreatitis, PNA, UTI, kidney stone and pyelonephritis. CMP reveals Na of 133 otherwise no electrolyte abnormalities. Lipase was not elevated.  CXR reveals a small RLL opacity consistent with atelectasis or PNA and a linear opacity in the left lung base consistent with atelectasis or scarring. CBC reveals a leukocytosis suggesting some sort of inflammation or infection. At this time, patient is signed out to incoming resident. Please see their note for any updates. Pending CT abdomen/pelvis read and urinalysis.    Amount and/or Complexity of Data Reviewed  Labs:  Decision-making details documented in ED Course.  Radiology:  Decision-making details documented in ED Course.  ECG/medicine tests:   Decision-making details documented in ED Course.        Procedure  Procedures     Jonas Cole  11/11/23 0106       Jonas Cole  11/11/23 9007    I, or a resident under my supervision, was present with the medical student who participated in the documentation of this note.  I have personally seen and examined the patient and performed the medical decision-making components. I have reviewed the medical student documentation and/or resident documentation and verified the findings in the note as written with additions or exceptions as stated in the body of the note.    Patient presents to the ED with R sided upper abdominal pain that has been progressive over the last two weeks.  +nausea  Decreased oral intake  Thinks he has had a fever?    PMHX: denies  PSHx: denies  Meds: denies  Soc Hx: daily tob    PE  Awake alert uncomfortable  PERRL anicteric  OP clear  Supple  Nl respiratory effort  RRR  RUQ TTP   R CVAT  No edema  +pulses  Ambulatory      Med Decision Making  [] Differential includes renal colic, biliary disease, pneumonia  [] will get imaging including CT and CXR  [] supportive care to include fluids , antiemetics and pain meds      Signed out to oncoming team awaiting CT scan       MD Chelsy Ryan MD  11/13/23 0196

## 2023-11-12 LAB
ALBUMIN SERPL BCP-MCNC: 3.4 G/DL (ref 3.4–5)
ANION GAP SERPL CALC-SCNC: 18 MMOL/L (ref 10–20)
BASOPHILS # BLD AUTO: 0.04 X10*3/UL (ref 0–0.1)
BASOPHILS NFR BLD AUTO: 0.3 %
BUN SERPL-MCNC: 9 MG/DL (ref 6–23)
CALCIUM SERPL-MCNC: 8.7 MG/DL (ref 8.6–10.6)
CHLORIDE SERPL-SCNC: 96 MMOL/L (ref 98–107)
CO2 SERPL-SCNC: 23 MMOL/L (ref 21–32)
CREAT SERPL-MCNC: 0.74 MG/DL (ref 0.5–1.3)
EOSINOPHIL # BLD AUTO: 0.03 X10*3/UL (ref 0–0.7)
EOSINOPHIL NFR BLD AUTO: 0.2 %
ERYTHROCYTE [DISTWIDTH] IN BLOOD BY AUTOMATED COUNT: 11.6 % (ref 11.5–14.5)
GFR SERPL CREATININE-BSD FRML MDRD: >90 ML/MIN/1.73M*2
GLUCOSE SERPL-MCNC: 64 MG/DL (ref 74–99)
HCT VFR BLD AUTO: 41.1 % (ref 41–52)
HCV AB SER QL: NONREACTIVE
HGB BLD-MCNC: 13.9 G/DL (ref 13.5–17.5)
HIV 1+2 AB+HIV1 P24 AG SERPL QL IA: NONREACTIVE
IMM GRANULOCYTES # BLD AUTO: 0.06 X10*3/UL (ref 0–0.7)
IMM GRANULOCYTES NFR BLD AUTO: 0.4 % (ref 0–0.9)
LEGIONELLA AG UR QL: NEGATIVE
LYMPHOCYTES # BLD AUTO: 1.47 X10*3/UL (ref 1.2–4.8)
LYMPHOCYTES NFR BLD AUTO: 9.9 %
MAGNESIUM SERPL-MCNC: 1.89 MG/DL (ref 1.6–2.4)
MCH RBC QN AUTO: 28.7 PG (ref 26–34)
MCHC RBC AUTO-ENTMCNC: 33.8 G/DL (ref 32–36)
MCV RBC AUTO: 85 FL (ref 80–100)
MONOCYTES # BLD AUTO: 1.46 X10*3/UL (ref 0.1–1)
MONOCYTES NFR BLD AUTO: 9.8 %
NEUTROPHILS # BLD AUTO: 11.86 X10*3/UL (ref 1.2–7.7)
NEUTROPHILS NFR BLD AUTO: 79.4 %
NRBC BLD-RTO: 0 /100 WBCS (ref 0–0)
PHOSPHATE SERPL-MCNC: 3.8 MG/DL (ref 2.5–4.9)
PLATELET # BLD AUTO: 300 X10*3/UL (ref 150–450)
POTASSIUM SERPL-SCNC: 3.6 MMOL/L (ref 3.5–5.3)
PROCALCITONIN SERPL-MCNC: 0.11 NG/ML
RBC # BLD AUTO: 4.85 X10*6/UL (ref 4.5–5.9)
S PNEUM AG UR QL: NEGATIVE
SODIUM SERPL-SCNC: 133 MMOL/L (ref 136–145)
VANCOMYCIN SERPL-MCNC: 2.5 UG/ML (ref 5–20)
WBC # BLD AUTO: 14.9 X10*3/UL (ref 4.4–11.3)

## 2023-11-12 PROCEDURE — 86803 HEPATITIS C AB TEST: CPT

## 2023-11-12 PROCEDURE — 85025 COMPLETE CBC W/AUTO DIFF WBC: CPT

## 2023-11-12 PROCEDURE — 1100000001 HC PRIVATE ROOM DAILY

## 2023-11-12 PROCEDURE — 83735 ASSAY OF MAGNESIUM: CPT

## 2023-11-12 PROCEDURE — 2500000004 HC RX 250 GENERAL PHARMACY W/ HCPCS (ALT 636 FOR OP/ED): Performed by: INTERNAL MEDICINE

## 2023-11-12 PROCEDURE — 80069 RENAL FUNCTION PANEL: CPT

## 2023-11-12 PROCEDURE — A4217 STERILE WATER/SALINE, 500 ML: HCPCS | Performed by: INTERNAL MEDICINE

## 2023-11-12 PROCEDURE — 80202 ASSAY OF VANCOMYCIN: CPT | Performed by: INTERNAL MEDICINE

## 2023-11-12 PROCEDURE — 36415 COLL VENOUS BLD VENIPUNCTURE: CPT

## 2023-11-12 PROCEDURE — 99232 SBSQ HOSP IP/OBS MODERATE 35: CPT | Performed by: INTERNAL MEDICINE

## 2023-11-12 PROCEDURE — 2500000004 HC RX 250 GENERAL PHARMACY W/ HCPCS (ALT 636 FOR OP/ED)

## 2023-11-12 PROCEDURE — 2500000001 HC RX 250 WO HCPCS SELF ADMINISTERED DRUGS (ALT 637 FOR MEDICARE OP)

## 2023-11-12 PROCEDURE — 2500000001 HC RX 250 WO HCPCS SELF ADMINISTERED DRUGS (ALT 637 FOR MEDICARE OP): Performed by: STUDENT IN AN ORGANIZED HEALTH CARE EDUCATION/TRAINING PROGRAM

## 2023-11-12 PROCEDURE — 87389 HIV-1 AG W/HIV-1&-2 AB AG IA: CPT

## 2023-11-12 PROCEDURE — 99233 SBSQ HOSP IP/OBS HIGH 50: CPT | Performed by: INTERNAL MEDICINE

## 2023-11-12 PROCEDURE — 87206 SMEAR FLUORESCENT/ACID STAI: CPT | Performed by: STUDENT IN AN ORGANIZED HEALTH CARE EDUCATION/TRAINING PROGRAM

## 2023-11-12 RX ORDER — ACETAMINOPHEN 325 MG/1
975 TABLET ORAL EVERY 8 HOURS PRN
Status: DISCONTINUED | OUTPATIENT
Start: 2023-11-12 | End: 2023-11-17 | Stop reason: HOSPADM

## 2023-11-12 RX ORDER — OXYCODONE HYDROCHLORIDE 5 MG/1
2.5 TABLET ORAL EVERY 6 HOURS PRN
Status: DISCONTINUED | OUTPATIENT
Start: 2023-11-12 | End: 2023-11-16

## 2023-11-12 RX ADMIN — OXYCODONE HYDROCHLORIDE 2.5 MG: 5 TABLET ORAL at 16:49

## 2023-11-12 RX ADMIN — PIPERACILLIN SODIUM AND TAZOBACTAM SODIUM 4.5 G: 4; .5 INJECTION, SOLUTION INTRAVENOUS at 04:36

## 2023-11-12 RX ADMIN — AZITHROMYCIN DIHYDRATE 500 MG: 500 TABLET ORAL at 09:45

## 2023-11-12 RX ADMIN — SODIUM CHLORIDE, POTASSIUM CHLORIDE, SODIUM LACTATE AND CALCIUM CHLORIDE 75 ML/HR: 600; 310; 30; 20 INJECTION, SOLUTION INTRAVENOUS at 13:12

## 2023-11-12 RX ADMIN — PIPERACILLIN SODIUM AND TAZOBACTAM SODIUM 4.5 G: 4; .5 INJECTION, SOLUTION INTRAVENOUS at 16:24

## 2023-11-12 RX ADMIN — PIPERACILLIN SODIUM AND TAZOBACTAM SODIUM 4.5 G: 4; .5 INJECTION, SOLUTION INTRAVENOUS at 22:21

## 2023-11-12 RX ADMIN — PIPERACILLIN SODIUM AND TAZOBACTAM SODIUM 4.5 G: 4; .5 INJECTION, SOLUTION INTRAVENOUS at 09:45

## 2023-11-12 RX ADMIN — SODIUM CHLORIDE, POTASSIUM CHLORIDE, SODIUM LACTATE AND CALCIUM CHLORIDE 75 ML/HR: 600; 310; 30; 20 INJECTION, SOLUTION INTRAVENOUS at 22:21

## 2023-11-12 RX ADMIN — VANCOMYCIN HYDROCHLORIDE 1500 MG: 1.5 INJECTION, POWDER, LYOPHILIZED, FOR SOLUTION INTRAVENOUS at 06:40

## 2023-11-12 ASSESSMENT — COGNITIVE AND FUNCTIONAL STATUS - GENERAL
DAILY ACTIVITIY SCORE: 24
DAILY ACTIVITIY SCORE: 24
MOBILITY SCORE: 24
MOBILITY SCORE: 24

## 2023-11-12 ASSESSMENT — PAIN - FUNCTIONAL ASSESSMENT
PAIN_FUNCTIONAL_ASSESSMENT: 0-10

## 2023-11-12 ASSESSMENT — PAIN SCALES - GENERAL
PAINLEVEL_OUTOF10: 0 - NO PAIN
PAINLEVEL_OUTOF10: 0 - NO PAIN
PAINLEVEL_OUTOF10: 7
PAINLEVEL_OUTOF10: 0 - NO PAIN
PAINLEVEL_OUTOF10: 5 - MODERATE PAIN

## 2023-11-12 NOTE — CARE PLAN
Problem: Pain  Goal: My pain/discomfort is manageable  Outcome: Progressing     Problem: Safety  Goal: Patient will be injury free during hospitalization  Outcome: Progressing  Goal: I will remain free of falls  Outcome: Progressing     Problem: Daily Care  Goal: Daily care needs are met  Outcome: Progressing     Problem: Psychosocial Needs  Goal: Demonstrates ability to cope with hospitalization/illness  Outcome: Progressing  Goal: Collaborate with me, my family, and caregiver to identify my specific goals  Outcome: Progressing  Flowsheets (Taken 11/11/2023 2028)  Cultural Requests During Hospitalization: none  Spiritual Requests During Hospitalization: none     Problem: Discharge Barriers  Goal: My discharge needs are met  Outcome: Progressing   The patient's goals for the shift include      The clinical goals for the shift include To remain HDS

## 2023-11-12 NOTE — PROGRESS NOTES
Dick Saldivar is a 28 y.o. male on day 1 of admission presenting with Septic embolism (CMS/HCC).    Subjective   Generally feeling well aside from pleuritic CP same as prior.  Otherwise, no new symptoms.       Objective     Physical Exam  Constitutional:       Appearance: Normal appearance.   HENT:      Head: Normocephalic and atraumatic.      Mouth/Throat:      Mouth: Mucous membranes are moist.   Eyes:      Pupils: Pupils are equal, round, and reactive to light.   Cardiovascular:      Rate and Rhythm: Normal rate and regular rhythm.      Heart sounds: No murmur heard.     No gallop.   Pulmonary:      Effort: Pulmonary effort is normal. No respiratory distress.      Breath sounds: No wheezing or rales.   Abdominal:      General: Abdomen is flat.      Palpations: Abdomen is soft.      Tenderness: There is no abdominal tenderness. There is no guarding.   Musculoskeletal:         General: No swelling or tenderness.      Cervical back: Normal range of motion.   Skin:     General: Skin is warm and dry.   Neurological:      General: No focal deficit present.      Mental Status: He is alert.   Psychiatric:         Mood and Affect: Mood normal.       Last Recorded Vitals  Blood pressure 109/67, pulse 94, temperature 37.5 °C (99.5 °F), resp. rate 18, height 1.829 m (6'), weight 76.7 kg (169 lb), SpO2 95 %.  Intake/Output last 3 Shifts:  I/O last 3 completed shifts:  In: 3372.1 (44 mL/kg) [I.V.:738.8 (9.6 mL/kg); IV Piggyback:2633.3]  Out: - (0 mL/kg)   Weight: 76.7 kg     Assessment/Plan   Principal Problem:    Septic embolism (CMS/HCC)    Mr. Saldiv[place] 28[place] significant medical history who presented to [facility] ED with 2 week history of SOA, right-sided chest pain, nausea, and emesis, found to have cavitary lung lesion and admitted for workup.  He is clinically stable, HDS, no resp distress.  See problem-based A&P below for detail:     #RLL Cavitary lesion  CTPE (11/11/23) Multifocal bilateral  nodular airspace disease with areas of cavitation in the right lower lobe   He has neutrophil-predominant leukocytosis.  Location is somewhat atypical for TB in immunocompetent pt.  Ddx includes: non-TB mycobacterium, septic emboli, nocardia.  Does not have other findings to suggest GPA, but will pursue ANCA testing pending negative workup for the above.  Plan:  - Continue Zosyn, vanc, azithromycin.  - AFB x3 for TB r/o - per policy q8h collection.  - Pending fungal studies, HIV, Bcx.  - IGRA is not drawn on weekend, but will order tomorrow.  - Surface echo pending.  - ID is consulted, appreciate recommendations.      O2: RA  Electrolytes: PRN  Lines/Tubes: PIV  Abx: Vanc, Zosyn, Azithro  Drips: None  Diet: Adult Regular  GI ppx: None  DVT ppx: SCDs  Code Status: Full Code (confirmed on Admission)  NOK: Mildred Oneill (Girlfriend) - [phone]  Dispo: Medicine Floor           Darell Hopper MD

## 2023-11-12 NOTE — CARE PLAN
The patient's goals for the shift include      The clinical goals for the shift include To remain HDS    Over the shift, the patient remains in airborne isolation  sputum sent for culture  tolerated mac and cheese  voiding via urinal  remains on iv fluids and antibiotics  call bell within reach

## 2023-11-12 NOTE — CONSULTS
Inpatient consult to Infectious Diseases  Consult performed by: Xiomara Prieto MD  Consult ordered by: Jillian Gandhi MD MPH      Primary MD: No primary care provider on file.    Reason For Consult  workup of septic pulmonary emboli     History Of Present Illness  Dick Saldivar is a 28 y.o. male presenting with a 2 week history of right lower chest pain associated with 2-day history of shortness of breath, nausea, vomiting, decreased appetite, and subjective fevers and chills associated with sweating.  Prior to this he was in his usual state of health with no history of chronic medical problems other than uncomplicated congenital umbilical hernia.  Underwent dental extraction for dental caries in 10/2022 (#18 caries to the pulp with necrosis of the pulp and chronic apical periodontitis).  Denies any unintentional or intentional weight loss, night sweats prior to 2 days ago, cough, rash, shortness of breath, abdominal pain, nausea/vomiting/diarrhea, genital lesions, or lymphadenopathy.    [place] [facility] [place] [facility] in [place], lives with girlfriend and 2-year-old daughter in duplex upper floor, no sick contacts, no pets, denies contact with animals including poultry, denies living close to any construction sites (adjacent building demolished approximately 6 months ago), denies working with soil or excavations.    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  States he was tested for HIV and hepatitis approximately 1 month ago as a occupational requirement working at the plasma center.  Has not been rejected from donating plasma.  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    No known contact with people with tuberculosis, no known family members with tuberculosis back home.    Patient endorsed history of smoking since 2016, denied ever using IV drugs, snorting drugs.  Rarely drinks alcohol.    Traveled to [place] approximately 6  months ago, otherwise no travel within or outside of [place] other than when he immigrated from the [place].    TODAY 11/11/23:  Febrile to 38C/100.4F, leukocytosis to 15.2, no eosinophilia.  Started on azithromycin, Zosyn, and vancomycin in the ED.  Hypoxic to low 90s, started on supplemental oxygen with 2 L by nasal cannula with improved saturations.  Mildly tachycardic up to 110  Tox screen positive for cannabinoids, negative for opioids.    CT chest/abdomen/pelvis notable for several pulmonary lesions with several cavitary/heterogenous lesions       Medications  Current medications:  Scheduled medications  azithromycin, 500 mg, oral, q24h KARTHIK  perflutren lipid microspheres, 0.5-10 mL of dilution, intravenous, Once in imaging  perflutren protein A microsphere, 0.5 mL, intravenous, Once in imaging  piperacillin-tazobactam, 4.5 g, intravenous, q6h  sulfur hexafluoride microsphr, 2 mL, intravenous, Once in imaging  tuberculin, 5 Units, intradermal, During hospitalization  vancomycin, 1,500 mg, intravenous, q12h      Continuous medications  lactated Ringer's, 75 mL/hr, Last Rate: 75 mL/hr (11/11/23 2057)      Objective  Range of Vitals (last 24 hours)  Heart Rate:  []   Temp:  [37.1 °C (98.8 °F)-37.3 °C (99.1 °F)]   Resp:  [14-26]   BP: (101-118)/(65-78)   Height:  [182.9 cm (6')]   Weight:  [76.7 kg (169 lb)]   SpO2:  [90 %-96 %]   Daily Weight  11/11/23 : 76.7 kg (169 lb)    Body mass index is 22.92 kg/m².        PHYSICAL EXAM  GENERAL APPEARANCE:  27 y/o  male, thin, mildly acutely ill-appearing, alert and cooperative, and appears to be in no acute distress.  BMI 22.9.  HEAD: normocephalic  EYES: PERRL, EOMI. Conjunctivae CLEAR  NOSE: No nasal discharge.  THROAT: Oral mucosa moist.  Teeth and gingiva in good general condition.  No mucosal lesions appreciated.  No tonsillar erythema or exudate.    NECK: Scant nontender cervical lymphadenopathy  CARDIAC: Regular rate and rhythm. No murmurs appreciated.  No pitting edema.  LUNGS: Clear to auscultation bilaterally. No rales, rhonchi, wheezing or diminished breath sounds. Normal work of breathing.  On 2 L NC saturating 96% at rest.  Able to speak in full sentences.  ABDOMEN: Positive bowel sounds. Soft, nondistended, right upper quadrant/right lateral chest tenderness to palpation, no guarding or rebound. No masses appreciated.  Small reducible umbilical hernia noted.  MUSCULOSKELETAL: No joint erythema or tenderness appreciated. Symmetric muscular development.  Left inguinal mild lymphadenopathy.  No axillary lymphadenopathy noted.  NEUROLOGICAL: No focal deficits. Moving all 4 ext.  SKIN: Skin pale. No lesions or eruptions on exposed areas.  PSYCHIATRIC: Appropriate affect.      Relevant Results    Labs  Results from last 72 hours   Lab Units 11/10/23  2344   WBC AUTO x10*3/uL 15.2*  15.2*   HEMOGLOBIN g/dL 14.7  14.7   HEMATOCRIT % 43.2  43.2   PLATELETS AUTO x10*3/uL 331  331   NEUTROS PCT AUTO % 80.5   LYMPHS PCT AUTO % 11.7   MONOS PCT AUTO % 6.9   EOS PCT AUTO % 0.1     Results from last 72 hours   Lab Units 11/10/23  2344   SODIUM mmol/L 133*   POTASSIUM mmol/L 3.9   CHLORIDE mmol/L 98   CO2 mmol/L 27   BUN mg/dL 11   CREATININE mg/dL 0.79   GLUCOSE mg/dL 81   CALCIUM mg/dL 9.7   ANION GAP mmol/L 12   EGFR mL/min/1.73m*2 >90     Results from last 72 hours   Lab Units 11/10/23  2344   ALK PHOS U/L 113   BILIRUBIN TOTAL mg/dL 0.9   PROTEIN TOTAL g/dL 8.1   ALT U/L 24   AST U/L 15   ALBUMIN g/dL 4.0     Estimated Creatinine Clearance: 125 mL/min (by C-G formula based on SCr of 0.79 mg/dL).  C-Reactive Protein   Date Value Ref Range Status   11/10/2023 11.80 (H) <1.00 mg/dL Final       Microbiology  11/11 Blood cultures x2 in process  11/11 Legionella urine antigen in process  11/11 Pneumococcal urine antigen in process  11/11 Staphylococcus aureus/MRSA colonization culture in process  11/11 urine Histoplasma antigen in process  11/11 serum Fungitell/beta D  glucan in process  11/11 serum Cryptococcal antigen ordered pending collection  11/11 Aspergillus galactomannan antigen in process  11/10 serum Histoplasma antigen in process      Imaging  CT Angiogram of the Chest; 11/11/2023 at 2:08 AM.        IMPRESSION:  Multifocal bilateral nodular airspace disease with areas of cavitation in the right lower lobe as described, consistent the patient's reported septic emboli. (RLL peripheral and extending to pleura space ?)  No evidence of pulmonary embolus identified.  Suspected reactive mediastinal and hilar adenopathy.    CT Abdomen and Pelvis with IV Contrast; 11/11/2023 at 12:52 a.m.        IMPRESSION:   Multifocal lesions in the lung bases including a dominant 4.8 x 2.4 cm cavitary right lower lobe lesion.  Appearance is highly suggestive of septic emboli.  Incompletely visualized process.    No acute pathology is identified in the abdomen or pelvis.   Umbilical hernia containing small bowel.    Chest Radiographs;  11/10/2023 at 11:57 p.m.        FINDINGS:       CARDIOMEDIASTINAL SILHOUETTE:    Cardiomediastinal silhouette is normal in size and configuration.  LUNGS:  Small focal opacity in the right lung base laterally.  Linear opacity in the left lung base.  Remainder of the lungs are clear.    No pleural effusions or pneumothorax.        Assessment/Plan        28-year-old male with past medical history significant only for dental caries (not recent) and uncomplicated umbilical hernia presenting with 2-week history of right lower chest pain associated with 2-day history of shortness of breath, nausea, vomiting, decreased appetite, and subjective fevers and chills associated with sweating.    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         Social history notable for: immigrated  from from [facility] [place], lives with girlfriend and 2-year-old daughter in duplex upper floor, no sick contacts, no pets, denies contact with animals including poultry, denies living close to any construction sites (adjacent building demolished approximately 6 months ago), denies working with soil or excavations.         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       No known contact with people with tuberculosis, no known family members with tuberculosis.       Patient denies ever using IV drugs.      Impression:  Multifocal pulmonary lesions, some with cavitations (especially at RLL with surrounding infiltrate, and quite peripheral and extending to pleura) and heterogenous appearing content,        Associated right upper quadrant/right thoracic pain, fever, malaise, N/V, sweats. No abnormal weight loss.        Differential includes infectious etiologies such as atypical presentation of tuberculosis, atypical bacterial infection, other fungal pneumonia such as aspergillosis, nocardiosis, histoplasmosis, blastomycosis, septic emboli; malignancy      Recommendations:  Continue Pip-tazo and azithromycin.  Can stop Vancomycin  Please obtain TB Spot assay  Please obtain induced sputum for bacterial culture   Please obtain induced sputum for AFB smear and culture x3,   Obtain blood cultures x3  Please initiate isolation precautions until AFB smear is negative x3  Please obtain urine histoplasma antigen, serum beta D glucan, serum galactomannan  Consider Pulmonary consult to consider IR guided biopsy/specimen sampling for cytology and culture of peripheral, RLL pulmonary lesion that may be amenable to biopsy.  Obtain Panorex of mouth/teeth  Send HIV test  Follow-up on pending cultures and echo      Patient seen and discussed with Dr. Riley. ID will follow.    Xiomara  MD Ida  ID Fellow, PGY IV.  Pager Team B: [phone].

## 2023-11-12 NOTE — PROGRESS NOTES
Vancomycin Dosing by Pharmacy- FOLLOW UP    Dick Saldivar is a 28 y.o. year old male who Pharmacy has been consulted for vancomycin dosing for endocarditis/endovascular infection. Based on the patient's indication and renal status this patient is being dosed based on a goal AUC of 400-600.     Renal function is currently stable.    Current vancomycin dose: 1500 mg given every 12 hours    Estimated vancomycin AUC on current dose: 401 mg/L.hr     Visit Vitals  /62   Pulse 88   Temp 36.9 °C (98.4 °F)   Resp 20        Lab Results   Component Value Date    CREATININE 0.74 11/12/2023    CREATININE 0.79 11/10/2023        I/O last 3 completed shifts:  In: 3372.1 (44 mL/kg) [I.V.:738.8 (9.6 mL/kg); IV Piggyback:2633.3]  Out: - (0 mL/kg)   Weight: 76.7 kg     Assessment/Plan    Within goal AUC range. Continue current vancomycin regimen.    This dosing regimen is predicted by [employer] to result in the following pharmacokinetic parameters:  Loading dose: N/A  Regimen: 1500 mg IV every 12 hours.  Start time: 18:40 on 11/12/2023  Exposure target: AUC24 (range)400-600 mg/L.hr   AUC24,ss: 401 mg/L.hr  Probability of AUC24 > 400: 50 %  Ctrough,ss: 8.4 mg/L  Probability of Ctrough,ss > 20: 1 %  Probability of nephrotoxicity (Lodise GARCIA 2009): 5 %      The next level will be obtained on 11/15 with AM labs. May be obtained sooner if clinically indicated.   Will continue to monitor renal function daily while on vancomycin and order serum creatinine at least every 48 hours if not already ordered.  Follow for continued vancomycin needs, clinical response, and signs/symptoms of toxicity.       Kelsie Ta, PharmD

## 2023-11-12 NOTE — PROGRESS NOTES
Vancomycin Dosing by Pharmacy- Cessation of Therapy    Consult to pharmacy for vancomycin dosing has been discontinued by the prescriber, pharmacy will sign off at this time.    Please call pharmacy if there are further questions or re-enter a consult if vancomycin is resumed.     Rosalba Clancy, JairD

## 2023-11-13 ENCOUNTER — APPOINTMENT (OUTPATIENT)
Dept: RADIOLOGY | Facility: HOSPITAL | Age: 28
DRG: 196 | End: 2023-11-13

## 2023-11-13 ENCOUNTER — APPOINTMENT (OUTPATIENT)
Dept: CARDIOLOGY | Facility: HOSPITAL | Age: 28
DRG: 196 | End: 2023-11-13

## 2023-11-13 LAB
ALBUMIN SERPL BCP-MCNC: 3.5 G/DL (ref 3.4–5)
ANION GAP SERPL CALC-SCNC: 12 MMOL/L (ref 10–20)
BASOPHILS # BLD AUTO: 0.06 X10*3/UL (ref 0–0.1)
BASOPHILS NFR BLD AUTO: 0.6 %
BUN SERPL-MCNC: 7 MG/DL (ref 6–23)
CALCIUM SERPL-MCNC: 8.8 MG/DL (ref 8.6–10.6)
CHLORIDE SERPL-SCNC: 97 MMOL/L (ref 98–107)
CO2 SERPL-SCNC: 28 MMOL/L (ref 21–32)
CREAT SERPL-MCNC: 0.67 MG/DL (ref 0.5–1.3)
EOSINOPHIL # BLD AUTO: 0.09 X10*3/UL (ref 0–0.7)
EOSINOPHIL NFR BLD AUTO: 0.9 %
ERYTHROCYTE [DISTWIDTH] IN BLOOD BY AUTOMATED COUNT: 11.7 % (ref 11.5–14.5)
GFR SERPL CREATININE-BSD FRML MDRD: >90 ML/MIN/1.73M*2
GLUCOSE SERPL-MCNC: 65 MG/DL (ref 74–99)
HCT VFR BLD AUTO: 43.1 % (ref 41–52)
HGB BLD-MCNC: 14.7 G/DL (ref 13.5–17.5)
IMM GRANULOCYTES # BLD AUTO: 0.05 X10*3/UL (ref 0–0.7)
IMM GRANULOCYTES NFR BLD AUTO: 0.5 % (ref 0–0.9)
LYMPHOCYTES # BLD AUTO: 1.46 X10*3/UL (ref 1.2–4.8)
LYMPHOCYTES NFR BLD AUTO: 14.2 %
MCH RBC QN AUTO: 29.3 PG (ref 26–34)
MCHC RBC AUTO-ENTMCNC: 34.1 G/DL (ref 32–36)
MCV RBC AUTO: 86 FL (ref 80–100)
MONOCYTES # BLD AUTO: 0.87 X10*3/UL (ref 0.1–1)
MONOCYTES NFR BLD AUTO: 8.5 %
NEUTROPHILS # BLD AUTO: 7.72 X10*3/UL (ref 1.2–7.7)
NEUTROPHILS NFR BLD AUTO: 75.3 %
NRBC BLD-RTO: 0 /100 WBCS (ref 0–0)
PHOSPHATE SERPL-MCNC: 3.3 MG/DL (ref 2.5–4.9)
PLATELET # BLD AUTO: 309 X10*3/UL (ref 150–450)
POTASSIUM SERPL-SCNC: 3.5 MMOL/L (ref 3.5–5.3)
RBC # BLD AUTO: 5.01 X10*6/UL (ref 4.5–5.9)
SODIUM SERPL-SCNC: 133 MMOL/L (ref 136–145)
STAPHYLOCOCCUS SPEC CULT: NORMAL
WBC # BLD AUTO: 10.3 X10*3/UL (ref 4.4–11.3)

## 2023-11-13 PROCEDURE — 99233 SBSQ HOSP IP/OBS HIGH 50: CPT | Performed by: INTERNAL MEDICINE

## 2023-11-13 PROCEDURE — 87798 DETECT AGENT NOS DNA AMP: CPT

## 2023-11-13 PROCEDURE — 36415 COLL VENOUS BLD VENIPUNCTURE: CPT | Performed by: STUDENT IN AN ORGANIZED HEALTH CARE EDUCATION/TRAINING PROGRAM

## 2023-11-13 PROCEDURE — 86481 TB AG RESPONSE T-CELL SUSP: CPT | Performed by: STUDENT IN AN ORGANIZED HEALTH CARE EDUCATION/TRAINING PROGRAM

## 2023-11-13 PROCEDURE — 85025 COMPLETE CBC W/AUTO DIFF WBC: CPT

## 2023-11-13 PROCEDURE — 70355 PANORAMIC X-RAY OF JAWS: CPT | Mod: FY

## 2023-11-13 PROCEDURE — 1100000001 HC PRIVATE ROOM DAILY

## 2023-11-13 PROCEDURE — 2500000004 HC RX 250 GENERAL PHARMACY W/ HCPCS (ALT 636 FOR OP/ED)

## 2023-11-13 PROCEDURE — 87116 MYCOBACTERIA CULTURE: CPT | Performed by: STUDENT IN AN ORGANIZED HEALTH CARE EDUCATION/TRAINING PROGRAM

## 2023-11-13 PROCEDURE — 80069 RENAL FUNCTION PANEL: CPT | Performed by: STUDENT IN AN ORGANIZED HEALTH CARE EDUCATION/TRAINING PROGRAM

## 2023-11-13 PROCEDURE — 2500000001 HC RX 250 WO HCPCS SELF ADMINISTERED DRUGS (ALT 637 FOR MEDICARE OP)

## 2023-11-13 RX ADMIN — PIPERACILLIN SODIUM AND TAZOBACTAM SODIUM 4.5 G: 4; .5 INJECTION, SOLUTION INTRAVENOUS at 10:34

## 2023-11-13 RX ADMIN — PIPERACILLIN SODIUM AND TAZOBACTAM SODIUM 4.5 G: 4; .5 INJECTION, SOLUTION INTRAVENOUS at 16:58

## 2023-11-13 RX ADMIN — PIPERACILLIN SODIUM AND TAZOBACTAM SODIUM 4.5 G: 4; .5 INJECTION, SOLUTION INTRAVENOUS at 05:29

## 2023-11-13 RX ADMIN — AZITHROMYCIN DIHYDRATE 500 MG: 500 TABLET ORAL at 08:46

## 2023-11-13 RX ADMIN — PIPERACILLIN SODIUM AND TAZOBACTAM SODIUM 4.5 G: 4; .5 INJECTION, SOLUTION INTRAVENOUS at 22:30

## 2023-11-13 NOTE — NURSING NOTE
Throughout today's shift, patient remains a&o x4. Cont'd continuous pulse ox. panoXR completed. Sputum collection #3 unsuccessful. IVF dc'ed. Tolerating a regular diet, supplements offered q meal. NPO at midnight for anticipated lung bx tomorrow. Patient is independent in the room; at change of shift he is resting in bed which is low & locked with call light within reach. Denies needs at this time.

## 2023-11-13 NOTE — PROGRESS NOTES
Dick Saldivar is a 28 y.o. male on day 2 of admission presenting with Septic embolism (CMS/HCC).    Subjective   Generally feeling well aside from pleuritic CP same as prior.  Otherwise, no new symptoms.       Objective     Physical Exam  Constitutional:       Appearance: Normal appearance.   HENT:      Head: Normocephalic and atraumatic.      Mouth/Throat:      Mouth: Mucous membranes are moist.   Eyes:      Pupils: Pupils are equal, round, and reactive to light.   Cardiovascular:      Rate and Rhythm: Normal rate and regular rhythm.      Heart sounds: No murmur heard.     No gallop.   Pulmonary:      Effort: Pulmonary effort is normal. No respiratory distress.      Breath sounds: No wheezing or rales.   Abdominal:      General: Abdomen is flat.      Palpations: Abdomen is soft.      Tenderness: There is no abdominal tenderness. There is no guarding.   Musculoskeletal:         General: No swelling or tenderness.      Cervical back: Normal range of motion.   Skin:     General: Skin is warm and dry.   Neurological:      General: No focal deficit present.      Mental Status: He is alert.   Psychiatric:         Mood and Affect: Mood normal.         Last Recorded Vitals  Blood pressure 104/67, pulse 89, temperature 37.4 °C (99.3 °F), resp. rate 15, height 1.829 m (6'), weight 76.7 kg (169 lb), SpO2 94 %.  Intake/Output last 3 Shifts:  I/O last 3 completed shifts:  In: 1078.8 (14.1 mL/kg) [P.O.:240; I.V.:738.8 (9.6 mL/kg); IV Piggyback:100]  Out: 1500 (19.6 mL/kg) [Urine:1500 (0.5 mL/kg/hr)]  Weight: 76.7 kg     Assessment/Plan   Principal Problem:    Septic embolism (CMS/HCC)    Mr. Saldivar is a 28M [place] immigrant without significant medical history who presented to [facility] ED with 2 week history of SOA, right-sided chest pain, nausea, and emesis, found to have cavitary lung lesion and admitted for workup.  He is clinically stable, HDS, no resp distress. Awaiting clearance from ID for TB and IR biopsy, pending  results from AFB smears (needs 3 negative, drawn 8 hours apart). Will remain on airborne precautions in interim.     #RLL Cavitary lesion  CTPE (11/11/23) Multifocal bilateral nodular airspace disease with areas of cavitation in the right lower lobe   He has neutrophil-predominant leukocytosis.  Location is somewhat atypical for TB in immunocompetent pt.  Ddx includes: non-TB mycobacterium, septic emboli, nocardia.  Does not have other findings to suggest GPA, but will pursue ANCA testing pending negative workup for the above.  Plan:  - Continue Zosyn  - AFB x3 for TB r/o - per policy q8h collection. Will have final one collected later today  - Pending fungal studies, HIV, Bcx.  - IGRA ordered today  - Surface echo pending  - ID is consulted, appreciate recommendations      O2: RA  Electrolytes: PRN  Lines/Tubes: PIV  Abx: Zosyn  Drips: None  Diet: Adult Regular  GI ppx: None  DVT ppx: SCDs  Code Status: Full Code (confirmed on Admission)  NOK: Mildred Oneill (Girlfriend) - [phone]  Dispo: Medicine Floor           Mercedes Roe MD

## 2023-11-13 NOTE — CARE PLAN
PCP - Abad Patterson MD  Referring Physician:     Subjective:   Patient ID:  Cody Castro is a 40 y.o. male with past medical history of  CAD s/p PCI (LAD, D1   RCA, OM1 ), HTN, HLD, tobacco use who presents for clinic follow up.     He was last seen by myself three months ago. He reports that he has been doing well overall since our last visit with him roughly three months ago. He denies having any more pre-syncopal episodes while at work. He also denies chest pain, shortness of breath, fever, chills, PND. He has been taking all of his medications as prescribed. He is still smoking and he would like to take some more time to think about potentially stopping.    History:     Social History     Tobacco Use    Smoking status: Former Smoker     Packs/day: 1.00     Years: 20.00     Pack years: 20.00     Types: Cigarettes     Quit date: 2019     Years since quittin.6    Smokeless tobacco: Never Used   Substance Use Topics    Alcohol use: Not Currently     Comment: occasional. none in 2 months     Family History   Problem Relation Age of Onset    Heart disease Mother     Heart disease Father        Meds:   Review of patient's allergies indicates:  No Known Allergies    Current Outpatient Medications:     aspirin (ECOTRIN) 81 MG EC tablet, Take 1 tablet (81 mg total) by mouth once daily., Disp: , Rfl: 0    clonazePAM (KLONOPIN) 2 MG Tab, Take 1 mg by mouth 2 (two) times daily as needed., Disp: , Rfl:     HYDROcodone-acetaminophen (NORCO)  mg per tablet, Take 1 tablet by mouth every 6 (six) hours as needed., Disp: , Rfl:     lisinopril 10 MG tablet, Take 1 tablet (10 mg total) by mouth once daily., Disp: 90 tablet, Rfl: 17    metoprolol succinate (TOPROL-XL) 50 MG 24 hr tablet, Take 1 tablet by mouth once a day, Disp: 30 tablet, Rfl: 6    rivaroxaban (XARELTO) 2.5 mg Tab, Take 1 tablet (2.5 mg total) by mouth 2 (two) times a day., Disp: 60 tablet, Rfl: 11    rosuvastatin (CRESTOR) 40  The patient's goals for the shift include managing pain throughout the shift     The clinical goals for the shift include report tolerable pain by end of shift    Over the shift, the patient did make progress toward the following goals. Pt medicated once for pain this shift. Pt reported pain relief and required no further interventions throughout the shift. Pt VSS. Will continue to monitor throughout he shift.   "MG Tab, Take 1 tablet by mouth every evening, Disp: 30 tablet, Rfl: 4      Objective:   /79 (BP Location: Left arm, Patient Position: Sitting, BP Method: Medium (Automatic))   Pulse 91   Ht 5' 6" (1.676 m)   Wt 112.3 kg (247 lb 9.2 oz)   BMI 39.96 kg/m²     Physical Exam  Gen: No apparent distress, resting comfortably  HEENT: Pupils equal and reactive to light  Cardio: Regular rate, point of maximal impulse not displaced, no murmur noted, 2+ radial pulses bilaterally, 2+ DP pulses bilaterally  Resp: CTAB, no wheezing  Abd: Soft, non-tender, non-distended  Skin: Warm, dry, no peripheral edema noted  Neuro: Alert and oriented x3  Psych: Normal mood and affect      Labs:     Lab Results   Component Value Date     03/16/2022    K 4.1 03/16/2022     03/16/2022    CO2 28 03/16/2022    BUN 17 03/16/2022    CREATININE 0.8 03/16/2022    ANIONGAP 7 (L) 03/16/2022     Lab Results   Component Value Date    HGBA1C 5.1 06/22/2018     Lab Results   Component Value Date    BNP 11 03/16/2022    BNP 24 05/03/2021    BNP 25 08/22/2020       Lab Results   Component Value Date    WBC 8.38 03/16/2022    HGB 13.6 (L) 03/16/2022    HCT 40.9 03/16/2022    HCT 46 09/23/2016     03/16/2022    GRAN 5.6 03/16/2022    GRAN 67.2 03/16/2022     Lab Results   Component Value Date    CHOL 163 03/25/2022    HDL 28 (L) 03/25/2022    LDLCALC 109.8 03/25/2022    TRIG 126 03/25/2022       Lab Results   Component Value Date     03/16/2022    K 4.1 03/16/2022     03/16/2022    CO2 28 03/16/2022    BUN 17 03/16/2022    CREATININE 0.8 03/16/2022    ANIONGAP 7 (L) 03/16/2022     Lab Results   Component Value Date    HGBA1C 5.1 06/22/2018     Lab Results   Component Value Date    BNP 11 03/16/2022    BNP 24 05/03/2021    BNP 25 08/22/2020    Lab Results   Component Value Date    WBC 8.38 03/16/2022    HGB 13.6 (L) 03/16/2022    HCT 40.9 03/16/2022    HCT 46 09/23/2016     03/16/2022    GRAN 5.6 03/16/2022    GRAN " 67.2 03/16/2022     Lab Results   Component Value Date    CHOL 163 03/25/2022    HDL 28 (L) 03/25/2022    LDLCALC 109.8 03/25/2022    TRIG 126 03/25/2022                Cardiovascular Imaging:     Echo 4/18/22:  · The left ventricle is normal in size with normal systolic function. The estimated ejection fraction is 55%.  · Normal right ventricular size with normal right ventricular systolic function.  · Normal left ventricular diastolic function.  · Normal central venous pressure (3 mmHg).       Assessment & Plan:     1. CAD s/p PCI (LAD, D1 2013  RCA, OM1 7/18)  -Continue ASA 81 mg and Xarelto 2.5 mg BID  -Continue high intensity statin    2. HF with recovered EF  -Last EF 55% in April 2022  -Increase metoprolol succinate to 100 mg qd  -Continue lisinopril 10 mg qd    3. HTN  -Continue metoprolol and lisinopril as above    4. HLD  - on most recent check  -Add Zetia 10 mg qd to rosuvastatin 40 mg qd    Case staffed with Dr. Garrison. RTC in 3 months or sooner if needed.      Signed:  Olayinka Berkowitz MD  [facility] PGY-4    Staff attestation to follow.
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
## 2023-11-14 ENCOUNTER — APPOINTMENT (OUTPATIENT)
Dept: CARDIOLOGY | Facility: HOSPITAL | Age: 28
DRG: 196 | End: 2023-11-14

## 2023-11-14 LAB
ABO GROUP (TYPE) IN BLOOD: NORMAL
ALBUMIN SERPL BCP-MCNC: 3.5 G/DL (ref 3.4–5)
ANION GAP SERPL CALC-SCNC: 16 MMOL/L (ref 10–20)
ANNOTATION COMMENT IMP: NORMAL
ANTIBODY SCREEN: NORMAL
AORTIC VALVE MEAN GRADIENT: 3
AORTIC VALVE PEAK VELOCITY: 1.12
APTT PPP: 33 SECONDS (ref 27–38)
AV PEAK GRADIENT: 5
AVA (PEAK VEL): 3.12
AVA (VTI): 3.08
BASOPHILS # BLD AUTO: 0.05 X10*3/UL (ref 0–0.1)
BASOPHILS NFR BLD AUTO: 0.6 %
BUN SERPL-MCNC: 9 MG/DL (ref 6–23)
CALCIUM SERPL-MCNC: 8.8 MG/DL (ref 8.6–10.6)
CHLORIDE SERPL-SCNC: 102 MMOL/L (ref 98–107)
CO2 SERPL-SCNC: 22 MMOL/L (ref 21–32)
CREAT SERPL-MCNC: 0.68 MG/DL (ref 0.5–1.3)
EJECTION FRACTION APICAL 4 CHAMBER: 65.4
EJECTION FRACTION: 58
EOSINOPHIL # BLD AUTO: 0.15 X10*3/UL (ref 0–0.7)
EOSINOPHIL NFR BLD AUTO: 1.9 %
ERYTHROCYTE [DISTWIDTH] IN BLOOD BY AUTOMATED COUNT: 11.6 % (ref 11.5–14.5)
FUNGITELL BETA-D GLUCAN,SERUM: <31 PG/ML
GFR SERPL CREATININE-BSD FRML MDRD: >90 ML/MIN/1.73M*2
GLUCOSE SERPL-MCNC: 73 MG/DL (ref 74–99)
HCT VFR BLD AUTO: 45.5 % (ref 41–52)
HGB BLD-MCNC: 14.2 G/DL (ref 13.5–17.5)
HIV 1+2 AB+HIV1 P24 AG SERPL QL IA: NONREACTIVE
IMM GRANULOCYTES # BLD AUTO: 0.03 X10*3/UL (ref 0–0.7)
IMM GRANULOCYTES NFR BLD AUTO: 0.4 % (ref 0–0.9)
INR PPP: 1.2 (ref 0.9–1.1)
LEFT ATRIUM VOLUME AREA LENGTH INDEX BSA: 26.7
LEFT VENTRICLE INTERNAL DIMENSION DIASTOLE: 4.9 (ref 3.5–6)
LEFT VENTRICULAR OUTFLOW TRACT DIAMETER: 2.1
LYMPHOCYTES # BLD AUTO: 1.16 X10*3/UL (ref 1.2–4.8)
LYMPHOCYTES NFR BLD AUTO: 14.9 %
M TB DNA SPT QL NAA+PROBE: NOT DETECTED
MAGNESIUM SERPL-MCNC: 2.05 MG/DL (ref 1.6–2.4)
MCH RBC QN AUTO: 28.3 PG (ref 26–34)
MCHC RBC AUTO-ENTMCNC: 31.2 G/DL (ref 32–36)
MCV RBC AUTO: 91 FL (ref 80–100)
MITRAL VALVE E/A RATIO: 1.12
MITRAL VALVE E/E' RATIO: 5.66
MONOCYTES # BLD AUTO: 0.68 X10*3/UL (ref 0.1–1)
MONOCYTES NFR BLD AUTO: 8.7 %
MTB AND RIF RESISTANCE PNL ISLT/SPM: NORMAL
NEUTROPHILS # BLD AUTO: 5.71 X10*3/UL (ref 1.2–7.7)
NEUTROPHILS NFR BLD AUTO: 73.5 %
NRBC BLD-RTO: 0 /100 WBCS (ref 0–0)
PHOSPHATE SERPL-MCNC: 4 MG/DL (ref 2.5–4.9)
PLATELET # BLD AUTO: 286 X10*3/UL (ref 150–450)
POTASSIUM SERPL-SCNC: 4.1 MMOL/L (ref 3.5–5.3)
PROTHROMBIN TIME: 13.6 SECONDS (ref 9.8–12.8)
RBC # BLD AUTO: 5.01 X10*6/UL (ref 4.5–5.9)
RH FACTOR (ANTIGEN D): NORMAL
RIGHT VENTRICLE FREE WALL PEAK S': 10
SODIUM SERPL-SCNC: 136 MMOL/L (ref 136–145)
TRICUSPID ANNULAR PLANE SYSTOLIC EXCURSION: 2.6
WBC # BLD AUTO: 7.8 X10*3/UL (ref 4.4–11.3)

## 2023-11-14 PROCEDURE — 87206 SMEAR FLUORESCENT/ACID STAI: CPT | Performed by: STUDENT IN AN ORGANIZED HEALTH CARE EDUCATION/TRAINING PROGRAM

## 2023-11-14 PROCEDURE — 2500000004 HC RX 250 GENERAL PHARMACY W/ HCPCS (ALT 636 FOR OP/ED)

## 2023-11-14 PROCEDURE — 83735 ASSAY OF MAGNESIUM: CPT

## 2023-11-14 PROCEDURE — 85025 COMPLETE CBC W/AUTO DIFF WBC: CPT

## 2023-11-14 PROCEDURE — 87116 MYCOBACTERIA CULTURE: CPT | Performed by: STUDENT IN AN ORGANIZED HEALTH CARE EDUCATION/TRAINING PROGRAM

## 2023-11-14 PROCEDURE — 93306 TTE W/DOPPLER COMPLETE: CPT | Performed by: INTERNAL MEDICINE

## 2023-11-14 PROCEDURE — 93306 TTE W/DOPPLER COMPLETE: CPT

## 2023-11-14 PROCEDURE — 84100 ASSAY OF PHOSPHORUS: CPT

## 2023-11-14 PROCEDURE — 1100000001 HC PRIVATE ROOM DAILY

## 2023-11-14 PROCEDURE — 86901 BLOOD TYPING SEROLOGIC RH(D): CPT

## 2023-11-14 PROCEDURE — 99232 SBSQ HOSP IP/OBS MODERATE 35: CPT

## 2023-11-14 PROCEDURE — 99232 SBSQ HOSP IP/OBS MODERATE 35: CPT | Performed by: INTERNAL MEDICINE

## 2023-11-14 PROCEDURE — 85730 THROMBOPLASTIN TIME PARTIAL: CPT

## 2023-11-14 PROCEDURE — 87389 HIV-1 AG W/HIV-1&-2 AB AG IA: CPT | Performed by: INTERNAL MEDICINE

## 2023-11-14 PROCEDURE — 36415 COLL VENOUS BLD VENIPUNCTURE: CPT

## 2023-11-14 PROCEDURE — 36415 COLL VENOUS BLD VENIPUNCTURE: CPT | Performed by: INTERNAL MEDICINE

## 2023-11-14 RX ADMIN — PIPERACILLIN SODIUM AND TAZOBACTAM SODIUM 4.5 G: 4; .5 INJECTION, SOLUTION INTRAVENOUS at 04:28

## 2023-11-14 RX ADMIN — PIPERACILLIN SODIUM AND TAZOBACTAM SODIUM 4.5 G: 4; .5 INJECTION, SOLUTION INTRAVENOUS at 09:31

## 2023-11-14 RX ADMIN — PIPERACILLIN SODIUM AND TAZOBACTAM SODIUM 4.5 G: 4; .5 INJECTION, SOLUTION INTRAVENOUS at 21:30

## 2023-11-14 RX ADMIN — PERFLUTREN 8.7 ML OF DILUTION: 6.52 INJECTION, SUSPENSION INTRAVENOUS at 15:16

## 2023-11-14 RX ADMIN — PIPERACILLIN SODIUM AND TAZOBACTAM SODIUM 4.5 G: 4; .5 INJECTION, SOLUTION INTRAVENOUS at 16:11

## 2023-11-14 ASSESSMENT — COGNITIVE AND FUNCTIONAL STATUS - GENERAL
MOBILITY SCORE: 24
DAILY ACTIVITIY SCORE: 24
DAILY ACTIVITIY SCORE: 24
MOBILITY SCORE: 24

## 2023-11-14 ASSESSMENT — PAIN SCALES - GENERAL: PAINLEVEL_OUTOF10: 0 - NO PAIN

## 2023-11-14 ASSESSMENT — PAIN - FUNCTIONAL ASSESSMENT: PAIN_FUNCTIONAL_ASSESSMENT: 0-10

## 2023-11-14 NOTE — PROGRESS NOTES
Dick Saldivar is a 28 y.o. male on day 3 of admission presenting with Septic embolism (CMS/HCC).    11/13/2023 Late entry.  Pt transferred  from ICU.  Noted no Insurance listed.  Email to HRS and DESTINY.

## 2023-11-14 NOTE — PROGRESS NOTES
Dick Saldivar is a 28 y.o. male on day 3 of admission presenting with Septic embolism (CMS/HCC).    Subjective   Generally feeling well aside from pleuritic CP, improving since admission.  Otherwise, no new symptoms. Denies any n/v, states that appetite and PO intake has improved. Spoke with patient further to elucidate etiologies for lung lesions. Denies history of half-way or other similar sheltering, denies intoxication from alcohol or illicit drugs. He does state that he spends a lot of time with his friends who travel back and forth from [place].       Objective     Physical Exam  Constitutional:       Appearance: Normal appearance.   HENT:      Head: Normocephalic and atraumatic.      Mouth/Throat:      Mouth: Mucous membranes are moist.   Eyes:      Pupils: Pupils are equal, round, and reactive to light.   Cardiovascular:      Rate and Rhythm: Normal rate and regular rhythm.      Heart sounds: No murmur heard.     No gallop.   Pulmonary:      Effort: Pulmonary effort is normal. No respiratory distress.      Breath sounds: No wheezing or rales.   Abdominal:      General: Abdomen is flat.      Palpations: Abdomen is soft.      Tenderness: There is no abdominal tenderness. There is no guarding.   Musculoskeletal:         General: No swelling or tenderness.      Cervical back: Normal range of motion.   Skin:     General: Skin is warm and dry.   Neurological:      General: No focal deficit present.      Mental Status: He is alert.   Psychiatric:         Mood and Affect: Mood normal.         Last Recorded Vitals  Blood pressure 109/73, pulse 93, temperature 36.1 °C (97 °F), resp. rate 18, height 1.829 m (6'), weight 76.7 kg (169 lb), SpO2 93 %.  Intake/Output last 3 Shifts:  I/O last 3 completed shifts:  In: 440 (5.7 mL/kg) [P.O.:240; IV Piggyback:200]  Out: - (0 mL/kg)   Weight: 76.7 kg     Assessment/Plan   Principal Problem:    Septic embolism (CMS/HCC)    [place] 28[place]  significant medical history who presented to [facility] ED with 2 week history of SOA, right-sided chest pain, nausea, and emesis, found to have cavitary lung lesion and admitted for workup.  He is clinically stable, HDS, no resp distress. Awaiting clearance from ID for TB and IR biopsy, pending results from AFB smears (needs 3 negative, drawn 8 hours apart). Will remain on airborne precautions in interim.     #RLL Cavitary lesion  CTPE (11/11/23) Multifocal bilateral nodular airspace disease with areas of cavitation in the right lower lobe   He has neutrophil-predominant leukocytosis.  Location is somewhat atypical for TB in immunocompetent pt.  Ddx includes: non-TB mycobacterium, septic emboli, nocardia.  Does not have other findings to suggest GPA, but will pursue ANCA testing pending negative workup for the above.  Plan:  - Continue Zosyn  - AFB x3 for TB r/o - per policy q8h collection. All three have been collected.  - Leukocytosis resolved, Hyponatremia with sodium still  at 133.  Will not draw further daily labs unless needed.  - HIV/ hep C antibodies negative.  - Strep pneumo/ Legionella antigens negative  - Blood cultures NGTD  - First AFB smear negative, pending read on the other two  - Histoplasma/beta D glucan/galactomannan pending  - IGRA ordered  - Surface echo pending  - ID is consulted, appreciate recommendations      O2: RA  Electrolytes: PRN  Lines/Tubes: PIV  Abx: Zosyn  Drips: None  Diet: Adult Regular  GI ppx: None  DVT ppx: SCDs  Code Status: Full Code (confirmed on Admission)  NOK: Mildred Oneill (Girlfriend) - [phone]  Dispo: Medicine Floor           Mercedes Roe MD

## 2023-11-15 PROBLEM — J98.4 CAVITARY LESION OF LUNG: Status: ACTIVE | Noted: 2023-11-15

## 2023-11-15 LAB
ASPERGILLUS GALACTOMANNAN EIA,SERUM: 0.05
BACTERIA BLD CULT: NORMAL
BACTERIA BLD CULT: NORMAL
NIL(NEG) CONTROL SPOT COUNT: NORMAL
PANEL A SPOT COUNT: 0
PANEL B SPOT COUNT: 0
POS CONTROL SPOT COUNT: NORMAL
T-SPOT. TB INTERPRETATION: NEGATIVE

## 2023-11-15 PROCEDURE — 99232 SBSQ HOSP IP/OBS MODERATE 35: CPT

## 2023-11-15 PROCEDURE — 2500000004 HC RX 250 GENERAL PHARMACY W/ HCPCS (ALT 636 FOR OP/ED)

## 2023-11-15 PROCEDURE — 1100000001 HC PRIVATE ROOM DAILY

## 2023-11-15 RX ADMIN — PIPERACILLIN SODIUM AND TAZOBACTAM SODIUM 4.5 G: 4; .5 INJECTION, SOLUTION INTRAVENOUS at 04:14

## 2023-11-15 RX ADMIN — PIPERACILLIN SODIUM AND TAZOBACTAM SODIUM 4.5 G: 4; .5 INJECTION, SOLUTION INTRAVENOUS at 22:06

## 2023-11-15 RX ADMIN — PIPERACILLIN SODIUM AND TAZOBACTAM SODIUM 4.5 G: 4; .5 INJECTION, SOLUTION INTRAVENOUS at 16:47

## 2023-11-15 RX ADMIN — PIPERACILLIN SODIUM AND TAZOBACTAM SODIUM 4.5 G: 4; .5 INJECTION, SOLUTION INTRAVENOUS at 09:31

## 2023-11-15 ASSESSMENT — COGNITIVE AND FUNCTIONAL STATUS - GENERAL
MOBILITY SCORE: 24
DAILY ACTIVITIY SCORE: 24

## 2023-11-15 ASSESSMENT — PAIN SCALES - GENERAL
PAINLEVEL_OUTOF10: 0 - NO PAIN
PAINLEVEL_OUTOF10: 0 - NO PAIN

## 2023-11-15 ASSESSMENT — PAIN - FUNCTIONAL ASSESSMENT: PAIN_FUNCTIONAL_ASSESSMENT: 0-10

## 2023-11-15 NOTE — NURSING NOTE
Throughout today's shift, patient remains a&o x4. Continuous pulse ox dc'ed. Echo completed. NPO dc'ed given at midnight for anticipated lung bx tomorrow. Patient is independent in the room; at change of shift he is resting in bed which is low & locked with call light within reach. Denies needs at this time.

## 2023-11-15 NOTE — PROGRESS NOTES
SW following for no insurance. HRS unable to reach pt's girlfriend for assessment. RN checked pt's bedside phone an ensured it was working for HRS to contact pt. SW spoke with pt via phone informed pt HRS would be contacting pt. [facility] provided with pt's room number.     ADDENDUM 1300: [facility] has screened pt and is working with pt to complete paperwork for Medicaid.    ASCENCION Hunt  Socail Worker   Secure Chat

## 2023-11-15 NOTE — PROGRESS NOTES
Dick Saldivar is a 28 y.o. male on day 4 of admission presenting with Septic embolism (CMS/HCC).    Subjective   Interval History: Patient seen and examined at bedside, sitting up on side of hospital bed, states he feels better, less cough, denies any fevers or chills, sputum production, shortness of breath or malaise.          Objective   Range of Vitals (last 24 hours)  Heart Rate:  [65-83]   Temp:  [36.6 °C (97.9 °F)-37 °C (98.6 °F)]   Resp:  [16-18]   BP: ()/(66-68)   SpO2:  [93 %-96 %]   Daily Weight  11/11/23 : 76.7 kg (169 lb)    Body mass index is 22.92 kg/m².    Physical Exam  GENERAL APPEARANCE:  27 y/o  male, thin, well-appearing, alert and cooperative, and appears to be in no acute distress.  BMI 22.9.  HEAD: normocephalic  EYES: PERRL, EOMI. Conjunctivae CLEAR  NOSE: No nasal discharge.  CARDIAC: Regular rate and rhythm. No murmurs appreciated. No pitting edema.  LUNGS: Clear to auscultation bilaterally. No rales, rhonchi, wheezing or diminished breath sounds. Normal work of breathing.  On room air.  Able to speak in full sentences.  ABDOMEN: Positive bowel sounds. Soft, nondistended  MUSCULOSKELETAL: No joint erythema or tenderness appreciated. Symmetric muscular development.  NEUROLOGICAL: Moving all 4 ext.  SKIN: Skin pale. No lesions or eruptions on exposed areas.  PSYCHIATRIC: Appropriate affect.      Relevant Results  Labs  Results from last 72 hours   Lab Units 11/14/23  0703 11/13/23  1210   WBC AUTO x10*3/uL 7.8 10.3   HEMOGLOBIN g/dL 14.2 14.7   HEMATOCRIT % 45.5 43.1   PLATELETS AUTO x10*3/uL 286 309   NEUTROS PCT AUTO % 73.5 75.3   LYMPHS PCT AUTO % 14.9 14.2   MONOS PCT AUTO % 8.7 8.5   EOS PCT AUTO % 1.9 0.9     Results from last 72 hours   Lab Units 11/14/23  0704 11/13/23  1210   SODIUM mmol/L 136 133*   POTASSIUM mmol/L 4.1 3.5   CHLORIDE mmol/L 102 97*   CO2 mmol/L 22 28   BUN mg/dL 9 7   CREATININE mg/dL 0.68 0.67   GLUCOSE mg/dL 73* 65*   CALCIUM mg/dL 8.8 8.8    ANION GAP mmol/L 16 12   EGFR mL/min/1.73m*2 >90 >90   PHOSPHORUS mg/dL 4.0 3.3     Results from last 72 hours   Lab Units 11/14/23  0704 11/13/23  1210   ALBUMIN g/dL 3.5 3.5     Estimated Creatinine Clearance: 125 mL/min (by C-G formula based on SCr of 0.68 mg/dL).  C-Reactive Protein   Date Value Ref Range Status   11/10/2023 11.80 (H) <1.00 mg/dL Final     Microbiology    Collected Updated Procedure Result Status    11/14/2023 0436 11/15/2023 1740 AFB Culture/Smear [[id number]]   Fluid from SPUTUM    Preliminary result Component Value   AFB Culture Culture in progress and will be examined weekly. A result will be issued either when positive or after 8 weeks incubation. P   AFB Stain No acid fast bacilli seen P          11/13/2023 0435 11/15/2023 1740 AFB Culture/Smear [[id number]]   Fluid from SPUTUM    Preliminary result Component Value   AFB Culture Culture in progress and will be examined weekly. A result will be issued either when positive or after 8 weeks incubation. P   AFB Stain No acid fast bacilli seen P          11/13/2023 0435 11/14/2023 1736 Mycobacterium TB Complex Detection and Rifampin Resistance by PCR, Sputum [[id number]]    Fluid from SPUTUM    Final result Component Value   Mycobacterium Tuberculosis, PCR Not Detected   MTB Rifampin, PCR Not applicable   MTB/ Rifampin Interpretation Mycobacteria tuberculosis target is not detected within the sample.          11/12/2023 1140 11/15/2023 1626 AFB Culture/Smear [[id number]]   Fluid from SPUTUM    Preliminary result Component Value   AFB Culture Culture in progress and will be examined weekly. A result will be issued either when positive or after 8 weeks incubation. P   AFB Stain No acid fast bacilli seen P                Assessment/Plan   28-year-old male with past medical history significant only for dental caries (not recent) and uncomplicated umbilical hernia presenting with 2-week history of right lower chest pain associated with 2-day history  "of shortness of breath, nausea, vomiting, decreased appetite, and subjective fevers and chills associated with sweating.         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       Social history notable for: immig[place] from from [facility] in [place], lives with girlfriend and 2-year-old daughter in duplex upper floor, no sick contacts, no pets, denies contact with animals including poultry, denies living close to any construction sites (adjacent building demolished approximately 6 months ago), denies working with soil or excavations.          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       No known contact with people with tuberculosis, no known family members with tuberculosis.       Patient denies ever using IV drugs.        Impression:  Multifocal pulmonary lesions, some with cavitations (especially at RLL with surrounding infiltrate, and quite peripheral and extending to pleura) and heterogenous appearing content  Associated right upper quadrant/right thoracic pain, fever, malaise, N/V, sweats. No abnormal weight loss.   Differential includes infectious etiologies such as atypical presentation of tuberculosis, atypical bacterial infection, other fungal pneumonia such as aspergillosis, nocardiosis, histoplasmosis, blastomycosis, septic emboli; malignancy.     11/12/2023 update:        Feels slightly better.  Had more cough today and produced brown discolored sputum for culture was collected by RN and sent to lab).  Additional work-up and diagnostic testing underway     11/14/2023 update       11/13/2023 [facility] showed             \". . There is a periapical lucency surrounding a right " "maxillary molar as  annotated on the radiograph. .\"       Sputum PCR negative for TB.         11/11/2023 sputum AFB negative       11/13/2023 and 11/14/2023 sputum AFB pairs pending    11/15/2023 update  Feels better, cough improved.  11/11/2023 sputum AFB smear negative  11/13/2023 sputum AFB smear negative  11/14/2023 sputum AFB smear negative   11/11/2023 galactomannan and beta D glucan negative  11/13/2023 T spot negative  11/13/2023 Sputum Mtb PCR negative       Recommendations:  Continue Pip-tazo   OK to DC isolation precautions  Follow-up on sputum bacterial, and AFB cultures.   Consider IR guided biopsy/specimen sampling for cytology and culture of peripheral, RLL pulmonary lesion that may be amenable to biopsy.  5.  Do not place a PICC LINE at this time.  Will hope to change to oral abx agent depending on cultures/smears.  6.  F/u on Histo Ag and CrAg        Patient seen and discussed with Dr. Riley. ID will follow.     Xiomara Prieto MD  ID Fellow, PGY IV.  Pager Team B: [phone].   I reviewed the resident/fellow's documentation and discussed the patient with the resident/fellow. I agree with the resident/fellow's medical decision making as documented in the note.     Abhay Riley MD       "

## 2023-11-15 NOTE — PROGRESS NOTES
Dick Saldivar is a 28 y.o. male on day 3 of admission presenting with Septic embolism (CMS/HCC).    Subjective   Interval History:        Patient seen during early afternoon rounds       Upright in chair at the bedside.  Feels much improved.  Has less pain.  Has less cough.  Patient has had no shortness of breath.  Reports having submitted 2 or 3 sputum for testing.       Appetite improving        Objective   Range of Vitals (last 24 hours)  Heart Rate:  [77-93]   Temp:  [36.1 °C (97 °F)-37 °C (98.6 °F)]   Resp:  [18]   BP: (101-111)/(63-73)   SpO2:  [93 %]   Daily Weight  11/11/23 : 76.7 kg (169 lb)    Body mass index is 22.92 kg/m².    Physical Exam  Up in chair   no acute distress   pleasant and interactive  Increased aeration in the right posterolateral base.  No wheezing.  Left lung clear, no wheezing  S1, S2, I did not hear a murmur  Nontender abdomen  No palmar peripheral rash  No peripheral edema      Relevant Results  Labs  Results from last 72 hours   Lab Units 11/14/23  0703 11/13/23  1210 11/12/23  0616   WBC AUTO x10*3/uL 7.8 10.3 14.9*   HEMOGLOBIN g/dL 14.2 14.7 13.9   HEMATOCRIT % 45.5 43.1 41.1   PLATELETS AUTO x10*3/uL 286 309 300   NEUTROS PCT AUTO % 73.5 75.3 79.4   LYMPHS PCT AUTO % 14.9 14.2 9.9   MONOS PCT AUTO % 8.7 8.5 9.8   EOS PCT AUTO % 1.9 0.9 0.2     Results from last 72 hours   Lab Units 11/14/23  0704 11/13/23  1210 11/12/23  0616   SODIUM mmol/L 136 133* 133*   POTASSIUM mmol/L 4.1 3.5 3.6   CHLORIDE mmol/L 102 97* 96*   CO2 mmol/L 22 28 23   BUN mg/dL 9 7 9   CREATININE mg/dL 0.68 0.67 0.74   GLUCOSE mg/dL 73* 65* 64*   CALCIUM mg/dL 8.8 8.8 8.7   ANION GAP mmol/L 16 12 18   EGFR mL/min/1.73m*2 >90 >90 >90   PHOSPHORUS mg/dL 4.0 3.3 3.8     Results from last 72 hours   Lab Units 11/14/23  0704 11/13/23  1210 11/12/23  0616   ALBUMIN g/dL 3.5 3.5 3.4     Estimated Creatinine Clearance: 125 mL/min (by C-G formula based on SCr of 0.68 mg/dL).  C-Reactive Protein   Date Value Ref  Range Status   11/10/2023 11.80 (H) <1.00 mg/dL Final     Microbiology  7/14/2023 sputum AFB smear pending  11/13/2023 sputum PCR analysis for MTB complex negative  11/13/2023 sputum AFB smear PENDING  11/12/2023 AFB smear NEGATIVE        Assessment/Plan   28-year-old male with past medical history significant only for dental caries (not recent) and uncomplicated umbilical hernia presenting with 2-week history of right lower chest pain associated with 2-day history of shortness of breath, nausea, vomiting, decreased appetite, and subjective fevers and chills associated with sweating.         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       Social history notable for: immig[place] from from [facility] in [place], lives with girlfriend and 2-year-old daughter in duplex upper floor, no sick contacts, no pets, denies contact with animals including poultry, denies living close to any construction sites (adjacent building demolished approximately 6 months ago), denies working with soil or excavations.          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       No known contact with people with tuberculosis, no known family members with tuberculosis.       Patient denies ever using IV drugs.        Impression:  Multifocal pulmonary lesions, some with cavitations (especially at RLL with surrounding infiltrate, and quite peripheral and extending to pleura) and heterogenous appearing content,        Associated right upper quadrant/right thoracic pain, fever, malaise, N/V, sweats. No abnormal weight loss.        Differential includes infectious etiologies such as atypical presentation of tuberculosis,  "atypical bacterial infection, other fungal pneumonia such as aspergillosis, nocardiosis, histoplasmosis, blastomycosis, septic emboli; malignancy     11/12/2023 update:        Feels slightly better.  Had more cough today and produced brown discolored sputum for culture was collected by RN and sent to lab).  Additional work-up and diagnostic testing underway    11/14/2023 update       11/13/2023 [facility] showed             \". . There is a periapical lucency surrounding a right maxillary molar as  annotated on the radiograph. .\"       Sputum PCR negative for TB.         11/11/2023 sputum AFB negative       11/13/2023 and 11/14/2023 sputum AFB pairs pending      Recommendations:  Continue Pip-tazo and azithromycin.  Can stop Vancomycin 11/12/23  TB Spot assay pending   Follow-up on sputum bacterial, and AFB cultures.   ID ordered HIV, beta-D-glucan, aspergillus galactomannan  Consider IR guided biopsy/specimen sampling for cytology and culture of peripheral, RLL pulmonary lesion that may be amenable to biopsy.  6.  Do not place a PICC LINE at this time.  Will hope to change to oral abx agent depending on cultures/smears.       I spent 25 minutes in the professional and overall care of this patient.  I spent 15 minutes charting    Abhay Riley MD  "

## 2023-11-15 NOTE — PROGRESS NOTES
Dick Saldivar is a 28 y.o. male on day 4 of admission presenting with Septic embolism (CMS/HCC).    Subjective   Generally feeling well aside from mild pleuritic CP, improving since admission.  Otherwise, no new symptoms. Denies any n/v, states that appetite and PO intake has improved. Patient currently does not have insurance SW/TCC working with patient to discuss options. Given that follow up care could possible be limited, working to remove patient from precautions and potentially have IR guided biopsy.       Objective     Physical Exam  Constitutional:       Appearance: Normal appearance.   HENT:      Head: Normocephalic and atraumatic.      Mouth/Throat:      Mouth: Mucous membranes are moist.   Eyes:      Pupils: Pupils are equal, round, and reactive to light.   Cardiovascular:      Rate and Rhythm: Normal rate and regular rhythm.      Heart sounds: No murmur heard.     No gallop.   Pulmonary:      Effort: Pulmonary effort is normal. No respiratory distress.      Breath sounds: No wheezing or rales.   Abdominal:      General: Abdomen is flat.      Palpations: Abdomen is soft.      Tenderness: There is no abdominal tenderness. There is no guarding.   Musculoskeletal:         General: No swelling or tenderness.      Cervical back: Normal range of motion.   Skin:     General: Skin is warm and dry.   Neurological:      General: No focal deficit present.      Mental Status: He is alert.   Psychiatric:         Mood and Affect: Mood normal.         Last Recorded Vitals  Blood pressure 97/66, pulse 70, temperature 36.8 °C (98.2 °F), resp. rate 18, height 1.829 m (6'), weight 76.7 kg (169 lb), SpO2 95 %.  Intake/Output last 3 Shifts:  I/O last 3 completed shifts:  In: 840 (11 mL/kg) [P.O.:240; IV Piggyback:600]  Out: - (0 mL/kg)   Weight: 76.7 kg     Assessment/Plan   Principal Problem:    Septic embolism (CMS/HCC)  Active Problems:    Cavitary lesion of lung    Mr. Saldivar is a 28M [place] immigrant without  significant medical history who [place] [facility] [place] with 2 week history of SOA, right-sided chest pain, nausea, and emesis, found to have cavitary lung lesion and admitted for workup.  He is clinically stable, HDS, no resp distress. Awaiting clearance from ID for TB and IR biopsy, pending results from AFB smears (needs 3 negative, drawn 8 hours apart). Will remain on airborne precautions in interim.     #RLL Cavitary lesion  CTPE (11/11/23) Multifocal bilateral nodular airspace disease with areas of cavitation in the right lower lobe   He has neutrophil-predominant leukocytosis.  Location is somewhat atypical for TB in immunocompetent pt.  Ddx includes: non-TB mycobacterium, septic emboli, nocardia.  Does not have other findings to suggest GPA, but will pursue ANCA testing pending negative workup for the above.  Plan:  - Continue Zosyn  - AFB x3 for TB r/o - per policy q8h collection. All three have been collected.  - Leukocytosis resolved.  Will not draw further daily labs unless needed.  - HIV/Hep C antibodies negative.  - Strep pneumo/ Legionella antigens negative  - Blood cultures NGTD  - First AFB smear negative, pending read on the other two  - Histoplasmaurine antigen negative, beta D glucan and galactomannan also negative  - IGRA ordered  - MTB RIF negative  - Surface echo pending  - ID is consulted, appreciate recommendations  - Will remove patient off precautions as soon as able      O2: RA  Electrolytes: PRN  Lines/Tubes: PIV  Abx: Zosyn  Drips: None  Diet: Adult Regular  GI ppx: None  DVT ppx: SCDs  Code Status: Full Code (confirmed on Admission)  NOK: Mildred Oneill (Girlfriend) - [phone]  Dispo: Medicine Floor           Mercedes Roe MD

## 2023-11-16 ENCOUNTER — APPOINTMENT (OUTPATIENT)
Dept: RADIOLOGY | Facility: HOSPITAL | Age: 28
DRG: 196 | End: 2023-11-16

## 2023-11-16 LAB
H CAPSUL AG UR QL: NOT DETECTED
SCAN RESULT: NORMAL
SCAN RESULT: NORMAL

## 2023-11-16 PROCEDURE — 2720000007 HC OR 272 NO HCPCS

## 2023-11-16 PROCEDURE — 87075 CULTR BACTERIA EXCEPT BLOOD: CPT

## 2023-11-16 PROCEDURE — 88305 TISSUE EXAM BY PATHOLOGIST: CPT | Performed by: STUDENT IN AN ORGANIZED HEALTH CARE EDUCATION/TRAINING PROGRAM

## 2023-11-16 PROCEDURE — 32408 CORE NDL BX LNG/MED PERQ: CPT

## 2023-11-16 PROCEDURE — 32408 CORE NDL BX LNG/MED PERQ: CPT | Performed by: RADIOLOGY

## 2023-11-16 PROCEDURE — 88312 SPECIAL STAINS GROUP 1: CPT | Performed by: STUDENT IN AN ORGANIZED HEALTH CARE EDUCATION/TRAINING PROGRAM

## 2023-11-16 PROCEDURE — 99232 SBSQ HOSP IP/OBS MODERATE 35: CPT | Performed by: INTERNAL MEDICINE

## 2023-11-16 PROCEDURE — 1100000001 HC PRIVATE ROOM DAILY

## 2023-11-16 PROCEDURE — 99152 MOD SED SAME PHYS/QHP 5/>YRS: CPT | Performed by: RADIOLOGY

## 2023-11-16 PROCEDURE — 71045 X-RAY EXAM CHEST 1 VIEW: CPT | Performed by: RADIOLOGY

## 2023-11-16 PROCEDURE — 2500000004 HC RX 250 GENERAL PHARMACY W/ HCPCS (ALT 636 FOR OP/ED)

## 2023-11-16 PROCEDURE — 2500000004 HC RX 250 GENERAL PHARMACY W/ HCPCS (ALT 636 FOR OP/ED): Performed by: RADIOLOGY

## 2023-11-16 PROCEDURE — 0BBF3ZX EXCISION OF RIGHT LOWER LUNG LOBE, PERCUTANEOUS APPROACH, DIAGNOSTIC: ICD-10-PCS | Performed by: RADIOLOGY

## 2023-11-16 PROCEDURE — 87486 CHLMYD PNEUM DNA AMP PROBE: CPT

## 2023-11-16 PROCEDURE — 87206 SMEAR FLUORESCENT/ACID STAI: CPT

## 2023-11-16 PROCEDURE — 88305 TISSUE EXAM BY PATHOLOGIST: CPT | Mod: TC,SUR | Performed by: STUDENT IN AN ORGANIZED HEALTH CARE EDUCATION/TRAINING PROGRAM

## 2023-11-16 PROCEDURE — 87581 M.PNEUMON DNA AMP PROBE: CPT

## 2023-11-16 PROCEDURE — 71045 X-RAY EXAM CHEST 1 VIEW: CPT

## 2023-11-16 PROCEDURE — 87102 FUNGUS ISOLATION CULTURE: CPT

## 2023-11-16 RX ORDER — FENTANYL CITRATE 50 UG/ML
INJECTION, SOLUTION INTRAMUSCULAR; INTRAVENOUS AS NEEDED
Status: COMPLETED | OUTPATIENT
Start: 2023-11-16 | End: 2023-11-16

## 2023-11-16 RX ORDER — MIDAZOLAM HYDROCHLORIDE 1 MG/ML
INJECTION INTRAMUSCULAR; INTRAVENOUS AS NEEDED
Status: COMPLETED | OUTPATIENT
Start: 2023-11-16 | End: 2023-11-16

## 2023-11-16 RX ADMIN — PIPERACILLIN SODIUM AND TAZOBACTAM SODIUM 4.5 G: 4; .5 INJECTION, SOLUTION INTRAVENOUS at 03:36

## 2023-11-16 RX ADMIN — MIDAZOLAM HYDROCHLORIDE 2 MG: 1 INJECTION, SOLUTION INTRAMUSCULAR; INTRAVENOUS at 10:45

## 2023-11-16 RX ADMIN — PIPERACILLIN SODIUM AND TAZOBACTAM SODIUM 4.5 G: 4; .5 INJECTION, SOLUTION INTRAVENOUS at 16:12

## 2023-11-16 RX ADMIN — PIPERACILLIN SODIUM AND TAZOBACTAM SODIUM 4.5 G: 4; .5 INJECTION, SOLUTION INTRAVENOUS at 21:58

## 2023-11-16 RX ADMIN — PIPERACILLIN SODIUM AND TAZOBACTAM SODIUM 4.5 G: 4; .5 INJECTION, SOLUTION INTRAVENOUS at 09:32

## 2023-11-16 RX ADMIN — FENTANYL CITRATE 50 MCG: 50 INJECTION, SOLUTION INTRAMUSCULAR; INTRAVENOUS at 10:45

## 2023-11-16 RX ADMIN — FENTANYL CITRATE 50 MCG: 50 INJECTION, SOLUTION INTRAMUSCULAR; INTRAVENOUS at 10:51

## 2023-11-16 ASSESSMENT — COGNITIVE AND FUNCTIONAL STATUS - GENERAL
MOBILITY SCORE: 24
DAILY ACTIVITIY SCORE: 24
MOBILITY SCORE: 24
DAILY ACTIVITIY SCORE: 24

## 2023-11-16 ASSESSMENT — PAIN SCALES - GENERAL
PAINLEVEL_OUTOF10: 0 - NO PAIN

## 2023-11-16 ASSESSMENT — PAIN - FUNCTIONAL ASSESSMENT: PAIN_FUNCTIONAL_ASSESSMENT: 0-10

## 2023-11-16 NOTE — POST-PROCEDURE NOTE
Interventional Radiology Brief Postprocedure Note    Attending: Dr. Deepthi Conroy    Assistant: Dr. Roby Garcia    Diagnosis: Cavitary right lower lobe mass    Description of procedure: Using CT guidance, a total of 2 passes were made into a left lower lobe nodule using a 18 Gauge BARD needle passed through a 17 gauge coaxial system. Scanning after each pass demonstrated no bleeding or pneumothorax. See radiology report for full details.      Anesthesia:  Local    Complications: None    Estimated Blood Loss: minimal    Medications  As of 11/16/23 1133      ondansetron (Zofran) injection 4 mg (mg) Total dose:  4 mg      Date/Time Rate/Dose/Volume Action       11/10/23  2339 4 mg Given               sodium chloride 0.9 % bolus 1,000 mL (mL/hr) Total volume:  2,133.33 mL*   *From user-documented volume     Date/Time Rate/Dose/Volume Action       11/10/23  2340 1,000 mL - 2,000 mL/hr (over 30 min) New Bag     11/11/23  0044 2,133.33 mL Stopped               iohexol (OMNIPaque) 350 mg iodine/mL solution 100 mL (mL) Total volume:  100 mL      Date/Time Rate/Dose/Volume Action       11/11/23  0039 100 mL Given               iohexol (OMNIPaque) 350 mg iodine/mL solution 75 mL (mL) Total volume:  75 mL      Date/Time Rate/Dose/Volume Action       11/11/23  0218 75 mL Given               piperacillin-tazobactam-dextrose (Zosyn) IV 4.5 g (mL/hr) Total dose:  54 g*   *From user-documented volume     Date/Time Rate/Dose/Volume Action       11/11/23  0340 4.5 g - 200 mL/hr (over 30 min) New Bag      0410 100 mL Stopped      1023 4.5 g - 200 mL/hr (over 30 min) New Bag      1053  (over 30 min) Stopped      1612 4.5 g - 200 mL/hr (over 30 min) New Bag      1642  (over 30 min) Stopped      2324 4.5 g - 200 mL/hr (over 30 min) - 100 mL New Bag      2354  (over 30 min) Stopped     11/12/23  0436 4.5 g - 200 mL/hr (over 30 min) New Bag      0506  (over 30 min) Stopped      0945 4.5 g - 200 mL/hr (over 30 min) New Bag      1015  (over 30  min) Stopped      1624 4.5 g - 200 mL/hr (over 30 min) New Bag      1654  (over 30 min) Stopped      2221 4.5 g - 200 mL/hr (over 30 min) New Bag      2251  (over 30 min) Stopped     11/13/23  0529 4.5 g - 200 mL/hr (over 30 min) New Bag      0559  (over 30 min) Stopped      1034 4.5 g - 200 mL/hr (over 30 min) New Bag      1104  (over 30 min) Stopped      1658 4.5 g - 200 mL/hr (over 30 min) New Bag      1728  (over 30 min) Stopped      2230 4.5 g - 200 mL/hr (over 30 min) - 100 mL New Bag      2315  (over 30 min) Stopped     11/14/23  0428 4.5 g - 200 mL/hr (over 30 min) - 100 mL New Bag      0500  (over 30 min) Stopped      0931 4.5 g - 200 mL/hr (over 30 min) New Bag      1001  (over 30 min) Stopped      1611 4.5 g - 200 mL/hr (over 30 min) New Bag      1641  (over 30 min) Stopped      2130 4.5 g - 200 mL/hr (over 30 min) - 300 mL New Bag      2200 0 mL Stopped     11/15/23  0414 4.5 g - 200 mL/hr (over 30 min) - 100 mL New Bag      0444 0 mL Stopped      0931 4.5 g - 200 mL/hr (over 30 min) New Bag      1001  (over 30 min) Stopped      1647 4.5 g - 200 mL/hr (over 30 min) New Bag      1717  (over 30 min) Stopped      2206 4.5 g - 200 mL/hr (over 30 min) - 300 mL New Bag      2236 0 mL Stopped     11/16/23  0336 4.5 g - 200 mL/hr (over 30 min) - 100 mL New Bag      0406 0 mL Stopped      0932 4.5 g - 200 mL/hr (over 30 min) New Bag      1002  (over 30 min) Stopped               vancomycin in dextrose 5 % (Vancocin) IVPB 1.5 g (mL/hr) Total dose:  1.5 g*   *From user-documented volume     Date/Time Rate/Dose/Volume Action       11/11/23  0410 1.5 g - 400 mL/hr (over 45 min) New Bag      4089 300 mL Stopped               perflutren lipid microspheres (Definity) injection 0.5-10 mL of dilution (mL of dilution) Total dose:  8.7 mL of dilution      Date/Time Rate/Dose/Volume Action       11/14/23  1516 8.7 mL of dilution Given               azithromycin (Zithromax) tablet 500 mg (mg) Total dose:  1,500 mg       Date/Time Rate/Dose/Volume Action       11/11/23  1012 500 mg Given     11/12/23  0945 500 mg Given     11/13/23  0846 500 mg Given               vancomycin (Vancocin) in dextrose 5 % water (D5W) 500 mL IV 1,500 mg (mL/hr) Total volume:  Not documented* Dosing weight:  76.7   *Total volume has not been documented. View each administration to see the amount administered.     Date/Time Rate/Dose/Volume Action       11/11/23  1600 1,500 mg - 333.3 mL/hr (over 90 min) New Bag      1730  (over 90 min) Stopped     11/12/23  0640 1,500 mg - 333.3 mL/hr (over 90 min) New Bag      0810  (over 90 min) Stopped               lactated Ringer's infusion (mL/hr) Total volume:  738.75 mL* Dosing weight:  76.7   *From user-documented volume     Date/Time Rate/Dose/Volume Action       11/11/23  2057 75 mL/hr New Bag      2325 75 mL/hr - 185 mL Rate Verify     11/12/23  0037 75 mL/hr - 90 mL Rate Verify      0038 75 mL/hr - 0 mL Rate Verify      0039 75 mL/hr - 0 mL Rate Verify      0040 0 mL       0041 75 mL/hr - 1.25 mL Rate Verify      0104 75 mL/hr - 28.75 mL Rate Verify      0131 75 mL/hr - 33.75 mL Rate Verify      0304 75 mL/hr - 116.25 mL Rate Verify      0305 75 mL/hr - 1.25 mL Rate Verify      0641 75 mL/hr - 270 mL Rate Verify      0651 75 mL/hr - 12.5 mL Rate Verify      0800 75 mL/hr Rate Verify      0900 75 mL/hr Rate Verify      1000 75 mL/hr Rate Verify      1100 75 mL/hr Rate Verify      1200 75 mL/hr Rate Verify      1300 75 mL/hr Rate Verify      1312 75 mL/hr New Bag      2221 75 mL/hr New Bag     11/13/23  1032  Stopped               oxyCODONE (Roxicodone) immediate release tablet 2.5 mg (mg) Total dose:  2.5 mg Dosing weight:  76.7      Date/Time Rate/Dose/Volume Action       11/12/23  1649 2.5 mg Given               fentaNYL PF (Sublimaze) injection (mcg) Total dose:  100 mcg      Date/Time Rate/Dose/Volume Action       11/16/23  1045 50 mcg Given      1051 50 mcg Given               midazolam (Versed) injection  (mg) Total dose:  2 mg      Date/Time Rate/Dose/Volume Action       11/16/23  1045 2 mg Given                   Specimens were sent for culture and pathology.      See detailed result report with images in PACS.    The patient tolerated the procedure well without incident or complication and is in stable condition.

## 2023-11-16 NOTE — PRE-PROCEDURE NOTE
Interventional Radiology Preprocedure Note    Indication for procedure: The primary encounter diagnosis was Cavitary lesion of lung. A diagnosis of Septic embolism (CMS/HCC) was also pertinent to this visit.    Relevant review of systems: NA    Relevant Labs:   Lab Results   Component Value Date    CREATININE 0.68 11/14/2023    EGFR >90 11/14/2023    INR 1.2 (H) 11/14/2023    PROTIME 13.6 (H) 11/14/2023       Planned Sedation/Anesthesia: Moderate    Airway assessment: normal    Directed physical examination:    Patient is alert and oriented x3 and in no acute distress.  Skin overlying the expected biopsy site is intact.    Mallampati: I (soft palate, uvula, fauces, and tonsillar pillars visible)    ASA Score: ASA 1 - Normal health patient    Benefits, risks and alternatives of procedure and planned sedation have been discussed with the patient and/or their representative. All questions answered and they agree to proceed.

## 2023-11-16 NOTE — PROGRESS NOTES
Dick Saldivar is a 28 y.o. male on day 5 of admission presenting with Septic embolism (CMS/HCC).    Subjective   Generally feeling well aside from mild pleuritic CP, improving since admission.  Otherwise, no new symptoms. Denies any n/v, states that appetite and PO intake has improved. Patient currently does not have insurance SW/TCC working with patient to discuss options. Patient on call for IR biopsy with aspiration today.       Objective     Physical Exam  Constitutional:       Appearance: Normal appearance.   HENT:      Head: Normocephalic and atraumatic.      Mouth/Throat:      Mouth: Mucous membranes are moist.   Eyes:      Pupils: Pupils are equal, round, and reactive to light.   Cardiovascular:      Rate and Rhythm: Normal rate and regular rhythm.      Heart sounds: No murmur heard.     No gallop.   Pulmonary:      Effort: Pulmonary effort is normal. No respiratory distress.      Breath sounds: No wheezing or rales.   Abdominal:      General: Abdomen is flat.      Palpations: Abdomen is soft.      Tenderness: There is no abdominal tenderness. There is no guarding.   Musculoskeletal:         General: No swelling or tenderness.      Cervical back: Normal range of motion.   Skin:     General: Skin is warm and dry.   Neurological:      General: No focal deficit present.      Mental Status: He is alert.   Psychiatric:         Mood and Affect: Mood normal.         Last Recorded Vitals  Blood pressure 103/71, pulse 84, temperature 36.9 °C (98.4 °F), temperature source Temporal, resp. rate 18, height 1.829 m (6'), weight 76.7 kg (169 lb), SpO2 96 %.  Intake/Output last 3 Shifts:  I/O last 3 completed shifts:  In: 800 (10.4 mL/kg) [IV Piggyback:800]  Out: - (0 mL/kg)   Weight: 76.7 kg     Assessment/Plan   Principal Problem:    Septic embolism (CMS/HCC)  Active Problems:    Cavitary lesion of lung    Mr. Saldivar is a 28M [place] immigrant without significant medical history who [place] [facility] [place] 2  week history of SOA, right-sided chest pain, nausea, and emesis, found to have cavitary lung lesion and admitted for workup.  He is clinically stable, HDS, no resp distress. Patient has been cleared by ID and airborne precautions removed given three negative AFB smears. Awaiting IR guided biopsy.     #RLL Cavitary lesion  CTPE (11/11/23) Multifocal bilateral nodular airspace disease with areas of cavitation in the right lower lobe   He has neutrophil-predominant leukocytosis.  Location is somewhat atypical for TB in immunocompetent pt.  Ddx includes: non-TB mycobacterium, septic emboli, nocardia.  Does not have other findings to suggest GPA, but will pursue ANCA testing pending negative workup for the above.  Plan:  - Continue Zosyn  - AFB x3 for TB r/o - AFBs negative. Precautions removed  - HIV/Hep C antibodies negative.  - Strep pneumo/ Legionella antigens negative  - Blood cultures NGTD  - Histoplasmaurine antigen negative, beta D glucan and galactomannan also negative  - IGRA ordered  - MTB RIF negative  - Surface echo pending  - ID is consulted, appreciate recommendations  - Patient now off precautions      O2: RA  Electrolytes: PRN  Lines/Tubes: PIV  Abx: Zosyn  Drips: None  Diet: Adult Regular  GI ppx: None  DVT ppx: SCDs  Code Status: Full Code (confirmed on Admission)  NOK: Mildred Oneill (Girlfriend) - [phone]  Dispo: Medicine Floor           Mercedes Roe MD

## 2023-11-17 ENCOUNTER — PHARMACY VISIT (OUTPATIENT)
Dept: PHARMACY | Facility: CLINIC | Age: 28
End: 2023-11-17

## 2023-11-17 VITALS
SYSTOLIC BLOOD PRESSURE: 103 MMHG | HEART RATE: 77 BPM | TEMPERATURE: 98.2 F | OXYGEN SATURATION: 97 % | RESPIRATION RATE: 18 BRPM | HEIGHT: 72 IN | DIASTOLIC BLOOD PRESSURE: 64 MMHG | BODY MASS INDEX: 22.89 KG/M2 | WEIGHT: 169 LBS

## 2023-11-17 PROBLEM — J15.9 BACTERIAL PNEUMONIA: Status: ACTIVE | Noted: 2023-11-17

## 2023-11-17 PROBLEM — J98.4 CAVITARY LESION OF LUNG: Status: ACTIVE | Noted: 2023-11-17

## 2023-11-17 LAB
CHLAMYDIA.PNEUMONIAE PCR, VIRC: NORMAL
MYCOPLASMA.PNEUMONIAE PCR, VIRC: NORMAL

## 2023-11-17 PROCEDURE — 2500000004 HC RX 250 GENERAL PHARMACY W/ HCPCS (ALT 636 FOR OP/ED)

## 2023-11-17 PROCEDURE — 99238 HOSP IP/OBS DSCHRG MGMT 30/<: CPT

## 2023-11-17 PROCEDURE — RXMED WILLOW AMBULATORY MEDICATION CHARGE

## 2023-11-17 RX ORDER — AMOXICILLIN AND CLAVULANATE POTASSIUM 875; 125 MG/1; MG/1
1 TABLET, FILM COATED ORAL 2 TIMES DAILY
Qty: 28 TABLET | Refills: 0 | Status: SHIPPED | OUTPATIENT
Start: 2023-11-17 | End: 2023-12-01

## 2023-11-17 RX ADMIN — PIPERACILLIN SODIUM AND TAZOBACTAM SODIUM 4.5 G: 4; .5 INJECTION, SOLUTION INTRAVENOUS at 03:55

## 2023-11-17 RX ADMIN — PIPERACILLIN SODIUM AND TAZOBACTAM SODIUM 4.5 G: 4; .5 INJECTION, SOLUTION INTRAVENOUS at 10:30

## 2023-11-17 RX ADMIN — PIPERACILLIN SODIUM AND TAZOBACTAM SODIUM 4.5 G: 4; .5 INJECTION, SOLUTION INTRAVENOUS at 15:55

## 2023-11-17 ASSESSMENT — COGNITIVE AND FUNCTIONAL STATUS - GENERAL
DAILY ACTIVITIY SCORE: 24
MOBILITY SCORE: 24

## 2023-11-17 ASSESSMENT — PAIN SCALES - GENERAL
PAINLEVEL_OUTOF10: 0 - NO PAIN

## 2023-11-17 ASSESSMENT — PAIN - FUNCTIONAL ASSESSMENT
PAIN_FUNCTIONAL_ASSESSMENT: 0-10

## 2023-11-17 NOTE — PROGRESS NOTES
Dick Saldivar is a 28 y.o. male on day 6 of admission presenting with Septic embolism (CMS/HCC).    Subjective   Generally feeling well aside from mild pleuritic CP, improving since admission.  Otherwise, no new symptoms. Denies any n/v, states that appetite and PO intake has improved. Patient currently does not have insurance SW/TCC working with patient to discuss options. Patient now day 1 s/p IR biopsy. Will follow cultures to make sure negative and likely discharge soon.    Patient attempting to enroll in medicaid. Patient in contact with medicaid representative, instructed to call them back and request that paperwork be sent. Nursing staff aware that they may send enrollment papers         Objective     Physical Exam  Constitutional:       Appearance: Normal appearance.   HENT:      Head: Normocephalic and atraumatic.      Mouth/Throat:      Mouth: Mucous membranes are moist.   Eyes:      Pupils: Pupils are equal, round, and reactive to light.   Cardiovascular:      Rate and Rhythm: Normal rate and regular rhythm.      Heart sounds: No murmur heard.     No gallop.   Pulmonary:      Effort: Pulmonary effort is normal. No respiratory distress.      Breath sounds: No wheezing or rales.   Abdominal:      General: Abdomen is flat.      Palpations: Abdomen is soft.      Tenderness: There is no abdominal tenderness. There is no guarding.   Musculoskeletal:         General: No swelling or tenderness.      Cervical back: Normal range of motion.   Skin:     General: Skin is warm and dry.   Neurological:      General: No focal deficit present.      Mental Status: He is alert.   Psychiatric:         Mood and Affect: Mood normal.         Last Recorded Vitals  Blood pressure 110/70, pulse 76, temperature 37 °C (98.6 °F), resp. rate 18, height 1.829 m (6'), weight 76.7 kg (169 lb), SpO2 99 %.  Intake/Output last 3 Shifts:  I/O last 3 completed shifts:  In: 500 (6.5 mL/kg) [IV Piggyback:500]  Out: - (0 mL/kg)   Weight:  76.7 kg     Assessment/Plan   Principal Problem:    Septic embolism (CMS/HCC)  Active Problems:    Cavitary lesion of lung    Mr. Saldivar is a 28M [place] immigrant without significant medical history who [place] [facility] [place] 2 week history of SOA, right-sided chest pain, nausea, and emesis, found to have cavitary lung lesion and admitted for workup.  He is clinically stable, HDS, no resp distress. Patient has been cleared by ID and airborne precautions removed given three negative AFB smears. Awaiting IR guided biopsy.     #RLL Cavitary lesion  CTPE (11/11/23) Multifocal bilateral nodular airspace disease with areas of cavitation in the right lower lobe   He has neutrophil-predominant leukocytosis.  Location is somewhat atypical for TB in immunocompetent pt.  Ddx includes: non-TB mycobacterium, septic emboli, nocardia.  Does not have other findings to suggest GPA, but will pursue ANCA testing pending negative workup for the above.  Plan:  - Continue Zosyn  - AFB x3 for TB r/o - AFBs negative. Precautions removed  - HIV/Hep C antibodies negative.  - Strep pneumo/ Legionella antigens negative  - Blood cultures NGTD  - Histoplasmaurine antigen negative, beta D glucan and galactomannan also negative  - IGRA ordered  - MTB RIF negative  - Surface echo pending  - ID is consulted, appreciate recommendations  - Patient now off precautions  - S/p IR guided biopsy 11/16  - Planning to send patient home with augmentin course      O2: RA  Electrolytes: PRN  Lines/Tubes: PIV  Abx: Zosyn  Drips: None  Diet: Adult Regular  GI ppx: None  DVT ppx: SCDs  Code Status: Full Code (confirmed on Admission)  NOK: Mildred Oneill (Girlfriend) - [phone]  Dispo: Medicine Floor           Mercedes Roe MD

## 2023-11-17 NOTE — DISCHARGE INSTRUCTIONS
Dear Mr. Saldivar,    You were admitted to the hospital after you were found to have right sided chest pain. Our imaging demonstrated that you have a cavitary lesion (a cheerio shaped nodule) in your lung. The lesion was initially concerning for an infection called Tuberculosis. While you were here we ordered several tests to confirm that you did not have tuberculosis. All these tests were negative so we were able to take you off airborne precautions. We also ordered many lab tests to test for different fungi which all have been negative thus far. On 11/16/23 we did a biopsy and aspirate (fluid collection) of your lung to run tests on what this lesion may have been caused by.    We would like for you to follow up with a provider outpatient to follow up these studies and ensure that you are continuing to improve. We are aware of your insurance difficulties but whether or not you are able to be enrolled, please attempt to schedule an appointment at [facility] or somewhere convenient for you where you can follow up. We would recommend you get repeat imaging of your lungs to make sure that the lesion is not increasing in size or developing concerning features. In the meantime we would like you to take a medication called Augmentin. This medication was brought to your hospital room. Please take this medication twice a day for two weeks (14 days).     It was a pleasure taking care of you!    The [facility] Team

## 2023-11-17 NOTE — PROGRESS NOTES
11/17/23 @1558 Transitional Care Coordinator Note  Notified by Judith Pizarro LCSW that the pt had a Medicaid Pending number obtained: [id number]. I notified the team and made them aware of this, Dr. Cuellar is aware. Will continue to follow. Veronique Lee RN

## 2023-11-17 NOTE — CARE PLAN
The patient's goals for the shift include      The clinical goals for the shift include pt will remain afebrile today  Pt denies sob or cp  walking about the room  continues on iv antibiotics  afebrile today  oral intake poor due to no appetite  using call bell for assistance

## 2023-11-17 NOTE — PROGRESS NOTES
Signatures obtained for HRS medicaid forms and returned to [facility] for processing. Awaiting Medicaid Pending number to move forward with discharge planning.    Update 1456:  Medicaid Pending number obtained: [id number]    Judith Pizarro LCSW

## 2023-11-18 NOTE — DISCHARGE SUMMARY
Discharge Diagnosis  Cavitary lung lesion of right lower lobe    Issues Requiring Follow-Up  Follow up on lung lesion    Test Results Pending At Discharge  Pending Labs       Order Current Status    Cryptococcal Antigen, CSF/Serum Collected (11/12/23 8669)    AFB Culture/Smear In process    Surgical Pathology Exam In process    AFB Culture/Smear Preliminary result    AFB Culture/Smear Preliminary result    AFB Culture/Smear Preliminary result    Fungal Culture/Smear Preliminary result    Sterile Fluid Culture/Smear Preliminary result            Hospital Course  [place]. [place] 28[place] significant medical history who [facility] with 2 week history of SOA, right-sided chest pain, nausea, and emesis, found to have pulmonary septic emboli and a RLL cavitary lesion, who is being admitted for infectious workup on 11/11/23.    While admitted to the hospital patient was placed on airborne precautions in a negative pressure room given concern for tuberculosis infection given imaging findings. Infectious disease was consulted and they recommended zosyn for anaerobic coverage as well as further infectious workup with fungal serology and cultures/smears. AFB smears were negative 3x and airborne precautions were removed. Fungal workup was negative as well. On 11/16/2023 patient had IR guided biopsy and aspiration of lung lesion. Patient symptomatically improved significantly with resolved right sided chest pain.    Patient was given 14 day course of oral augmentin to complete upon discharge. Patient enrolled in medicaid while at hospital and now has number available to use for registering appointments.    Things to follow up outpatient:  Infectious Disease to follow up on studies placed from IR guided biopsy and aspiration on 11/16/23. Cytology, cultures, and other infectious workup.  Can assess need for further workup based off these results and perhaps repeat imaging with CXR if  warranted.    Home Medications     Medication List      START taking these medications     amoxicillin-pot clavulanate 875-125 mg tablet; Commonly known as:   Augmentin; Take 1 tablet (875 mg) by mouth 2 times a day for 14 days.       Outpatient Follow-Up  Future Appointments   Date Time Provider Department Center   12/1/2023 11:20 AM Abhay Riley MD [id number] [facility]       Mercedes Roe MD

## 2023-11-19 LAB
BACTERIA FLD CULT: NORMAL
GRAM STN SPEC: NORMAL
GRAM STN SPEC: NORMAL

## 2023-11-28 LAB
LABORATORY COMMENT REPORT: NORMAL
PATH REPORT.COMMENTS IMP SPEC: NORMAL
PATH REPORT.FINAL DX SPEC: NORMAL
PATH REPORT.GROSS SPEC: NORMAL
PATH REPORT.RELEVANT HX SPEC: NORMAL
PATH REPORT.TOTAL CANCER: NORMAL

## 2023-12-01 ENCOUNTER — FOLLOW-UP (OUTPATIENT)
Dept: INFECTIOUS DISEASES | Facility: CLINIC | Age: 28
End: 2023-12-01

## 2023-12-01 VITALS — OXYGEN SATURATION: 96 % | DIASTOLIC BLOOD PRESSURE: 59 MMHG | HEART RATE: 82 BPM | SYSTOLIC BLOOD PRESSURE: 119 MMHG

## 2023-12-01 DIAGNOSIS — J18.9 PNEUMONIA OF BOTH LUNGS DUE TO INFECTIOUS ORGANISM, UNSPECIFIED PART OF LUNG: Primary | ICD-10-CM

## 2023-12-01 PROCEDURE — 99214 OFFICE O/P EST MOD 30 MIN: CPT | Performed by: INTERNAL MEDICINE

## 2023-12-01 NOTE — PROGRESS NOTES
Infectious Diseases Clinic Follow-up:    Reason for Visit: Hospital follow-up      History of Current Issue  Hospital follow-up for multifocal pneumonia in November 2023   11/15/2023 inpatient ID note follows    Collected Updated Procedure Result Status     11/14/2023 0436 11/15/2023 1740 AFB Culture/Smear [[id number]]   Fluid from SPUTUM    Preliminary result Component Value   AFB Culture Culture in progress and will be examined weekly. A result will be issued either when positive or after 8 weeks incubation. P   AFB Stain No acid fast bacilli seen P            11/13/2023 0435 11/15/2023 1740 AFB Culture/Smear [[id number]]   Fluid from SPUTUM    Preliminary result Component Value   AFB Culture Culture in progress and will be examined weekly. A result will be issued either when positive or after 8 weeks incubation. P   AFB Stain No acid fast bacilli seen P            11/13/2023 0435 11/14/2023 1736 Mycobacterium TB Complex Detection and Rifampin Resistance by PCR, Sputum [[id number]]    Fluid from SPUTUM    Final result Component Value   Mycobacterium Tuberculosis, PCR Not Detected   MTB Rifampin, PCR Not applicable   MTB/ Rifampin Interpretation Mycobacteria tuberculosis target is not detected within the sample.            11/12/2023 1140 11/15/2023 1626 AFB Culture/Smear [[id number]]   Fluid from SPUTUM    Preliminary result Component Value   AFB Culture Culture in progress and will be examined weekly. A result will be issued either when positive or after 8 weeks incubation. P   AFB Stain No acid fast bacilli seen P               11/15/2023 Inpatient ID Assessment/Plan              28-year-old male with past medical history significant only for dental caries (not recent) and uncomplicated umbilical hernia presenting with 2-week history of right lower chest pain associated with 2-day history of shortness of breath, nausea, vomiting, decreased appetite, and subjective fevers and chills associated with sweating.           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            Social history notable for: immig[place] from from [place] [facility] [place], lives with girlfriend and 2-year-old daughter in duplex upper floor, no sick contacts, no pets, denies contact with animals including poultry, denies living close to any construction sites (adjacent building demolished approximately 6 months ago), denies working with soil or excavations.            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       No known contact with people with tuberculosis, no known family members with tuberculosis.       Patient denies ever using IV drugs.          11/15/2023 ID Impression:       Multifocal pulmonary lesions, some with cavitations (especially at RLL with surrounding infiltrate, and quite peripheral and extending to pleura) and heterogenous appearing content       Associated right upper quadrant/right thoracic pain, fever, malaise, N/V, sweats. No abnormal weight loss.        Differential includes infectious etiologies such as atypical presentation of tuberculosis, atypical bacterial infection, other fungal pneumonia such as aspergillosis, nocardiosis, histoplasmosis, blastomycosis, septic emboli; malignancy.          11/12/2023 update:             Feels slightly better.  Had more cough today and produced brown discolored sputum for culture was collected by RN and sent to lab).  Additional work-up and diagnostic testing underway         11/14/2023 update            11/13/2023 Pan[facility]x showed             \". . There is a periapical lucency surrounding a right maxillary molar as annotated on the radiograph. .\"             Sputum " PCR negative for TB.         11/11/2023 sputum AFB negative       11/13/2023 and 11/14/2023 sputum AFB pairs pending           11/15/2023 update         Feels better, cough improved.  11/11/2023 sputum AFB smear negative  11/13/2023 sputum AFB smear negative  11/14/2023 sputum AFB smear negative   11/11/2023 galactomannan and beta D glucan negative  11/13/2023 T spot negative  11/13/2023 Sputum Mtb PCR negative        11/15/2023 inpatient ID Recommendations:  Continue Pip-tazo   OK to DC isolation precautions  Follow-up on sputum bacterial, and AFB cultures.   Consider IR guided biopsy/specimen sampling for cytology and culture of peripheral, RLL pulmonary lesion that may be amenable to biopsy.  5.  Do not place a PICC LINE at this time.  Will hope to change to oral abx agent depending on cultures/smears.  6.  F/u on Histo Ag and CrAg             12/01/2023 ID CLINIC FOLLOW UP   Reviewed pending laboratory studies.   Patient discharged on 2-week course of oral Augmentin   11/16/2023 right lower lobe biopsy showed organizing pneumonia.  Culture negative.  Fungal and AFB smears negative and cultures pending.   11/14/2023 HIV testing negative   11/14/2023 sputum AFB negative   11/13/2023 TB spot testing negative   11/13/2023 sputum MTB PCR negative   11/13/2023 sputum AFB smear negative, culture pending x8w  11/12/2023 sputum AFB smear negative, culture pending x8w   11/11/2023 Aspergillus galactomannan negative, beta D glucan negative, urine histoplasma antigen negative, urine Streptococcus pneumoniae antigen negative          TODAY       Blood pressure is 119/59 and pulse 82       Overall patient feels well.  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  He wears gloves, lab coat and face shield       Has 1 pill of Augmentin remaining.  Has had no adverse side effects.  Specifically, patient denies rash, nausea, vomiting, and diarrhea.  Also patient feels like he is  returned to normal.  He has had no chest pain, shortness of breath, PND, orthopnea, sputum, or cough.  Also has had no fever, night sweats, weight loss, loss of appetite, or dysuria.       PATIENT quit smoking when hospitalized.  Quit date set at 11/15/2023      ALLERGIES:    No Known Allergies    MEDICATIONS:    Current Outpatient Medications:     amoxicillin-pot clavulanate (Augmentin) 875-125 mg tablet, Take 1 tablet (875 mg) by mouth 2 times a day for 14 days., Disp: 28 tablet, Rfl: 0      PHYSICAL EXAMINATION:    Blood pressure 119/59, pulse 82, pulse ox on room air 96%       Slender build, no acute distress, alert and oriented  Pleasant and interactive, normal conversation  No cervical lymphadenopathy  No supraclavicular lymphadenopathy  No axillary lymphadenopathy  Lungs crystal-clear  S1, S2, I did not hear a murmur  Abdomen soft and nontender  No palmar or peripheral rash  No peripheral edema  Normal handgrip  Tandem gait      ASSESSMENT / RECOMMENDATIONS:    Multifocal pulmonary lesions, some with cavitations   Patient presented with community-acquired multifocal, cavitating pneumonia.  Patient has no risk factors for opportunistic pathogen.  Sputum culture negative for AFB and fungus at this time.  Final cultures for AFB still pending.  AFB smear negative.  Sputum MTB PCR negative.  TB spot assay negative   Patient was started on broad-spectrum antibiotics had a prompt clinical response and felt well after 48 hours.              IR guided right lower lung lesion biopsy showed organizing pneumonia but special stains were negative.          Patient was discharged on 2-week course of oral Augmentin and today 12/1/2023 appears to have returned to baseline.  Patient is entirely asymptomatic.     Discussed influenza vaccine.  Patient deferred.   Discussed identifying primary care physician for himself and family.  His daughter sees a pediatrician.    Plan:  1. Completing 2-week course of oral Augmentin  treatment today, 12/1/2023.  Patient had received intravenous Zosyn and vancomycin while hospitalized.  2. Patient counseled about obtaining flu vaccine at local pharmacy (declined administration and ID clinic)  3.  To identify primary care physician for long term health care.  Patient also to contact Medicaid regarding his registration that apparently was set up during hospitalization.  4.  Patient quit smoking 11/15/2023.  Encouraged to remain free of tobacco.  5.  Contact information provided.  Patient can contact ID office as needed at [phone]    I spent 35 minutes in the professional and overall care of this patient.    I spent 15 minutes charting  Abhya Riley MD

## 2023-12-04 LAB
FUNGUS SPEC CULT: NORMAL
FUNGUS SPEC FUNGUS STN: NORMAL

## 2023-12-04 NOTE — DOCUMENTATION CLARIFICATION NOTE
"    PATIENT:               RAY GUTIERREZ  ACCT #:                  [id number]  MRN:                       [id number]  :                       1995  ADMIT DATE:       11/10/2023 10:24 PM  DISCH DATE:        2023 8:18 PM  RESPONDING PROVIDER #:        [id number]          PROVIDER RESPONSE TEXT:    I concur with the pathology report findings and they are clinically significant    CDI QUERY TEXT:    [facility]_Path Results Simple    Instruction:    Based on your assessment of the patient and the clinical information, please provide the requested documentation by clicking on the appropriate radio button and enter any additional information if prompted.    Question: Please document whether you concur or do not concur with the pathology report findings    When answering this query, please exercise your independent professional judgment. The fact that a question is being asked, does not imply that any particular answer is desired or expected.    The patient's clinical indicators include:  Clinical Information:  23 D/C Summary A Bhupinder ALARCON P Alonzo ALARCON  \"28M [facility] immigrant without significant medical history who presented to [facility] ED with 2 week history of SOA, right-sided chest pain, nausea, and emesis, found to have pulmonary septic emboli and a RLL cavitary lesion, who is being admitted for infectious workup on 23\"    Clinical Indicators and Pathology Findings:  11/10/23 2217 [id number] 104/60 94percent ra      23 HP Dr. Zamora and Dr. Baker  \"Effort: Pulmonary effort is normal.  Breath sounds: Normal breath sounds. No wheezing, rhonchi or rales.  Comments: Tenderness to palpation on right lower ribs and CVA\"    23 IR Bx-Using CT guidance, a total of 2 passes were made into a left lower lobe nodule using a 18 Gauge BARD needle passed through a 17 gauge coaxial system.    23 specimen collected 23 resulted  \"FINAL DIAGNOSIS  A. LUNG, RIGHT; BIOPSY:  Organizing pneumonia with focal acute " "and chronic inflammation.\"    Treatment:  Antibiotics  Labs  IR Bx  ID consult  Radiology exams    Risk Factors: septic emboli  Options provided:  -- I concur with the pathology report findings and they are clinically significant  -- I do not concur with the pathology report findings  -- Other - I will add my own diagnosis  -- Refer to Clinical Documentation Reviewer    Query created by: Carley Cortez on 12/1/2023 8:32 AM      Electronically signed by:  ELENITA GALICIA MD 12/4/2023 2:32 PM          "

## 2023-12-28 LAB
ACID FAST STN SPEC: ABNORMAL
MYCOBACTERIUM SPEC CULT: ABNORMAL

## 2024-01-03 LAB
ACID FAST STN SPEC: NORMAL
ACID FAST STN SPEC: NORMAL
MYCOBACTERIUM SPEC CULT: NORMAL
MYCOBACTERIUM SPEC CULT: NORMAL

## 2024-01-09 LAB
ACID FAST STN SPEC: NORMAL
MYCOBACTERIUM SPEC CULT: NORMAL